# Patient Record
Sex: FEMALE | Race: WHITE | NOT HISPANIC OR LATINO | Employment: UNEMPLOYED | URBAN - METROPOLITAN AREA
[De-identification: names, ages, dates, MRNs, and addresses within clinical notes are randomized per-mention and may not be internally consistent; named-entity substitution may affect disease eponyms.]

---

## 2018-04-19 ENCOUNTER — TELEPHONE (OUTPATIENT)
Dept: SCHEDULING | Facility: CLINIC | Age: 48
End: 2018-04-19

## 2018-04-19 RX ORDER — HYDROCHLOROTHIAZIDE 25 MG/1
25 TABLET ORAL DAILY
Qty: 90 TABLET | Refills: 0 | Status: SHIPPED | OUTPATIENT
Start: 2018-04-19 | End: 2018-08-08 | Stop reason: SDUPTHER

## 2018-06-20 RX ORDER — METOPROLOL TARTRATE 25 MG/1
1 TABLET, FILM COATED ORAL 2 TIMES DAILY PRN
COMMUNITY
Start: 2017-06-14 | End: 2023-09-06 | Stop reason: SDUPTHER

## 2018-06-27 ENCOUNTER — TELEPHONE (OUTPATIENT)
Dept: SCHEDULING | Facility: CLINIC | Age: 48
End: 2018-06-27

## 2018-06-27 NOTE — TELEPHONE ENCOUNTER
Pt called to cx appt today 6/27 and would like to reschedule as soon as possible.  Pt states location does not matter.  Please contact on mobile to reschedule    ty

## 2018-07-11 ENCOUNTER — OFFICE VISIT (OUTPATIENT)
Dept: CARDIOLOGY | Facility: CLINIC | Age: 48
End: 2018-07-11
Payer: COMMERCIAL

## 2018-07-11 VITALS
DIASTOLIC BLOOD PRESSURE: 86 MMHG | HEART RATE: 93 BPM | BODY MASS INDEX: 34.55 KG/M2 | WEIGHT: 202.38 LBS | HEIGHT: 64 IN | OXYGEN SATURATION: 98 % | SYSTOLIC BLOOD PRESSURE: 116 MMHG

## 2018-07-11 DIAGNOSIS — I45.6 WOLFF-PARKINSON-WHITE (WPW) SYNDROME: ICD-10-CM

## 2018-07-11 DIAGNOSIS — R60.9 EDEMA, UNSPECIFIED TYPE: ICD-10-CM

## 2018-07-11 DIAGNOSIS — I10 ESSENTIAL HYPERTENSION: ICD-10-CM

## 2018-07-11 DIAGNOSIS — I47.10 PAROXYSMAL SUPRAVENTRICULAR TACHYCARDIA (CMS/HCC): Primary | ICD-10-CM

## 2018-07-11 DIAGNOSIS — R42 DIZZINESS: ICD-10-CM

## 2018-07-11 PROCEDURE — 99214 OFFICE O/P EST MOD 30 MIN: CPT | Performed by: INTERNAL MEDICINE

## 2018-07-11 PROCEDURE — 93000 ELECTROCARDIOGRAM COMPLETE: CPT | Performed by: INTERNAL MEDICINE

## 2018-07-12 NOTE — PROGRESS NOTES
Larissa Galdamez MD, [facility]  Electrophysiology  Additional office in [facility]  [phone]  [phone]  [facility].org/[facility]  The Heart [address]  [address] [facility]  [place] [address]  Patient MRN: [id number]  Date: 2017  Description: Prog Notes ([facility]) Cardiology EP  Category: Progress Notes ([facility])  Provider ID: [id number]  Practice ID: [id number]  Marcus ID: [id number]  2018  RE: GREG VIRGEN  : 1970  I had the pleasure of seeing Greg Virgen in the office today for follow up.  I last saw her for follow-up in 2017 for a history of supraventricular tachycardia status post catheter mediated ablation procedure and hypertension. She was accompanied by her mother and her young daughter.     Today the patient reports that in the past year her blood pressure has remained stable on low-dose diuretic therapy with hydrochlorothiazide.  She also was having problems in the past with edema which has also resolved.  She has not felt any episodes of sustained arrhythmias.  She only has occasional palpitations.  She has not taken the metoprolol tartrate which was prescribed to be taken as needed since she has not had any significant palpitations.  She has been losing weight on a weight watchers diet.  She states that her weight went even higher after I saw her one year ago and on the weight watchers diet she has lost at least 27 pounds.  She also reports that her and the personal level her the  stress in her personal life and her marriage have significantly improved and she is doing much better from the emotional standpoint.  She does the has not had any new medical problems diagnosed and no new medications.  However she does describes intermittent episodes of dizziness with an occasions may be frequent and worse with the hot weather.  She sometimes has to sit down since she is concerned  "that she could pass out but she has not had any true episodes of near syncope or syncope.  She recalls that she has had a history of vasovagal syncope when she was pregnant and the episodes feel the same way.  She has been taking the diuretic hydrochlorothiazide daily for blood pressure and lower extremity edema.  No associated palpitations with the episodes of dizziness.  No chest pain, PND orthopnea or pedal edema.      PROBLEM LIST:  1. Hypertension.  2. Supraventricular tachycardia status post ablation of a left-sided atrioventricular accessory  pathway in 1996.  3. Catheter ablation in 2009 for inducible atrioventricular node reentrant tachycardia.  4. Obesity.  5. Anxiety and depression secondary to significant stress in her personal life.  6.  Vasovagal symptoms during pregnancy.      CURRENT MEDICATIONS:    Current Outpatient Prescriptions:   •  hydrochlorothiazide (HYDRODIURIL) 25 mg tablet, Take 1 tablet (25 mg total) by mouth daily., Disp: 90 tablet, Rfl: 0  •  metoprolol tartrate (LOPRESSOR) 25 mg tablet, Take 1 tablet by mouth 2 (two) times a day as needed (palpitations or high blood pressue).  , Disp: , Rfl:      ALLERGIES: Sulfa.    SOCIAL HISTORY: She is  and lives with her  and younger daughter.  No history of cigarette smoking.  She drinks alcohol on social occasions.  Her mother who accompany her in the office today was diagnosed with cancer last this past year but is doing very well in remission.    REVIEW OF SYSTEMS: Review of systems as above in the History of Present Illness.    Vitals:    07/11/18 1238   BP: 116/86   BP Location: Right upper arm   Patient Position: Sitting   Pulse: 93   SpO2: 98%   Weight: 91.8 kg (202 lb 6 oz)   Height: 1.626 m (5' 4\")     PHYSICAL EXAMINATION: Healthy-appearing young woman in no acute distress.  Neck revealed no jugular venous distention.  No carotid bruits.  Lungs were clear to  auscultation. No rales or wheezes. Cardiovascular exam revealed " normal S1, S2, regular rhythm  with no murmurs, gallops or rubs. Abdomen was soft and nontender.  Obese.  No masses. Extremities  revealed no edema. Alert and oriented x 3, no focal deficits to gross neurologic exam.  Skin: No lesions.  Psychiatric normal mood and affect.    DATA REVIEW: I personally reviewed the 12-lead EKG today, which reveals sinus rhythm,  normal EKG.    Laboratory data from June 21, 2017 which included a CBC, comprehensive metabolic panel, magnesium level and TSH level were normal except for very mild elevation of liver function tests.    IMPRESSION/RECOMMENDATIONS:  1. Hypertension - Her blood pressure is well controlled on a daily dose of hydrochlorothiazide side.  However I told her to decrease her dose to every other day, particularly in the summer months, since her symptoms of intermittent dizziness may be secondary to low blood pressure and or dehydration in addition to vasovagal symptoms.    2.  Dizziness: As noted above I advised her to decrease her dose of hydrochlorothiazide to every other day and she may even take it take it only as needed's's to avoid dehydration particularly during the summer months and low blood pressure.  I will order a repeat comprehensive metabolic panel, magnesium level, TSH level and CBC for further evaluation to make sure that her electrolytes are within normal limits on the higher diuretic therapy and that she does not have anemia.      3. History of supraventricular tachycardia status post catheter-mediated ablation of AV  accessory pathway in 1996 and AV node reentrant tachycardia in 2009 - She has not had  recurrence of the arrhythmias.    4. History of anxiety and depression secondary to significant stress in her personal life -she is feeling much better.  There is no indication for treatment with antianxiety medication.    5.  Edema: She complain of  upper and lower extremity edema the time of the last office visit 1 year ago.  The symptoms have  resolved with mild general mild diuretic therapy.  I had asked her to obtain an echocardiogram last year but she had to cancel lead since on the day that she was schedule her daughter was sick.  I will reorder an echocardiogram to evaluate her left ventricular systolic function.      Larissa Elias M.D., [facility]

## 2018-07-16 ENCOUNTER — TELEPHONE (OUTPATIENT)
Dept: CARDIOLOGY | Facility: CLINIC | Age: 48
End: 2018-07-16

## 2018-07-16 NOTE — TELEPHONE ENCOUNTER
----- Message from Larissa Galdamez MD sent at 7/11/2018  9:08 PM EDT -----  Regarding: Echocardiogram  Please call the patient and let her know that I forgot to discussed with her obtaining an echocardiogram.  I ordered one last year but she had to cancel it when her daughter was sick.  I do not believe that she had the echocardiogram performed.  I order another one in addition to the blood tests.  She can have the echocardiogram at the [facility] or [facility].  It is not urgent but she should have it done in the next few months.  Thanks

## 2018-08-08 RX ORDER — HYDROCHLOROTHIAZIDE 25 MG/1
TABLET ORAL
Qty: 90 TABLET | Refills: 1 | Status: SHIPPED | OUTPATIENT
Start: 2018-08-08 | End: 2019-06-03 | Stop reason: SDUPTHER

## 2018-09-13 ENCOUNTER — TRANSCRIBE ORDERS (OUTPATIENT)
Dept: SCHEDULING | Age: 48
End: 2018-09-13

## 2018-09-13 DIAGNOSIS — R92.8 OTHER ABNORMAL AND INCONCLUSIVE FINDINGS ON DIAGNOSTIC IMAGING OF BREAST: Primary | ICD-10-CM

## 2018-09-13 DIAGNOSIS — N63.0 MASS OF BREAST: ICD-10-CM

## 2018-09-19 ENCOUNTER — HOSPITAL ENCOUNTER (OUTPATIENT)
Dept: RADIOLOGY | Age: 48
Discharge: HOME | End: 2018-09-19
Attending: OBSTETRICS & GYNECOLOGY
Payer: COMMERCIAL

## 2018-09-19 ENCOUNTER — HOSPITAL ENCOUNTER (OUTPATIENT)
Dept: RADIOLOGY | Age: 48
Discharge: HOME | End: 2018-09-19
Attending: RADIOLOGY
Payer: COMMERCIAL

## 2018-09-19 DIAGNOSIS — R92.8 OTHER ABNORMAL AND INCONCLUSIVE FINDINGS ON DIAGNOSTIC IMAGING OF BREAST: ICD-10-CM

## 2018-09-19 DIAGNOSIS — N63.0 MASS OF BREAST: ICD-10-CM

## 2018-09-19 DIAGNOSIS — R92.8 ABNORMAL MAMMOGRAM: ICD-10-CM

## 2018-09-19 PROCEDURE — 76642 ULTRASOUND BREAST LIMITED: CPT | Mod: 50

## 2018-09-19 PROCEDURE — 77066 DX MAMMO INCL CAD BI: CPT

## 2019-06-03 RX ORDER — HYDROCHLOROTHIAZIDE 25 MG/1
TABLET ORAL
Qty: 90 TABLET | Refills: 1 | Status: SHIPPED | OUTPATIENT
Start: 2019-06-03 | End: 2020-01-14 | Stop reason: SDUPTHER

## 2019-06-03 NOTE — TELEPHONE ENCOUNTER
90 Day supply       Medicine Refill Request    Last Office Visit: Visit date not found  Next Office Visit: Visit date not found        Current Outpatient Prescriptions:   •  hydrochlorothiazide (HYDRODIURIL) 25 mg tablet, Take 1 tablet by mouth every other day., Disp: 90 tablet, Rfl: 1  •  metoprolol tartrate (LOPRESSOR) 25 mg tablet, Take 1 tablet by mouth 2 (two) times a day as needed (palpitations or high blood pressue).  , Disp: , Rfl:       BP Readings from Last 3 Encounters:   07/11/18 116/86       Recent Lab results:  No results found for: CHOL, No results found for: HDL, No results found for: LDLCALC, No results found for: TRIG     Lab Results   Component Value Date    GLUCOSE 100 (H) 06/29/2015   , No results found for: HGBA1C      Lab Results   Component Value Date    CREATININE 0.89 06/29/2015       Lab Results   Component Value Date    TSH 2.84 06/29/2015

## 2019-06-28 ENCOUNTER — TRANSCRIBE ORDERS (OUTPATIENT)
Dept: SCHEDULING | Age: 49
End: 2019-06-28

## 2019-06-28 DIAGNOSIS — R92.8 OTHER ABNORMAL AND INCONCLUSIVE FINDINGS ON DIAGNOSTIC IMAGING OF BREAST: Primary | ICD-10-CM

## 2019-07-02 ENCOUNTER — TELEPHONE (OUTPATIENT)
Dept: SCHEDULING | Facility: CLINIC | Age: 49
End: 2019-07-02

## 2019-07-09 ENCOUNTER — HOSPITAL ENCOUNTER (OUTPATIENT)
Dept: RADIOLOGY | Facility: HOSPITAL | Age: 49
Discharge: HOME | End: 2019-07-09
Attending: OBSTETRICS & GYNECOLOGY
Payer: COMMERCIAL

## 2019-07-09 DIAGNOSIS — R92.8 OTHER ABNORMAL AND INCONCLUSIVE FINDINGS ON DIAGNOSTIC IMAGING OF BREAST: ICD-10-CM

## 2019-07-09 PROCEDURE — 76642 ULTRASOUND BREAST LIMITED: CPT | Mod: LT

## 2019-07-26 ENCOUNTER — TRANSCRIBE ORDERS (OUTPATIENT)
Dept: SCHEDULING | Age: 49
End: 2019-07-26

## 2019-07-26 DIAGNOSIS — R94.5 ABNORMAL RESULTS OF LIVER FUNCTION STUDIES: ICD-10-CM

## 2019-07-26 DIAGNOSIS — K76.0 FATTY (CHANGE OF) LIVER, NOT ELSEWHERE CLASSIFIED: ICD-10-CM

## 2019-07-26 DIAGNOSIS — R10.11 RIGHT UPPER QUADRANT PAIN: Primary | ICD-10-CM

## 2019-08-07 NOTE — TELEPHONE ENCOUNTER
Pt called needs to r/s appt that was for today with Dr Galdamez, Pt requesting any appt between 08/19-08/26 b/c she starts a new job. No avail appts. Pt can be reached at [phone]

## 2019-08-19 ENCOUNTER — HOSPITAL ENCOUNTER (OUTPATIENT)
Dept: RADIOLOGY | Facility: HOSPITAL | Age: 49
Discharge: HOME | End: 2019-08-19
Attending: INTERNAL MEDICINE
Payer: COMMERCIAL

## 2019-08-19 DIAGNOSIS — R94.5 ABNORMAL RESULTS OF LIVER FUNCTION STUDIES: ICD-10-CM

## 2019-08-19 DIAGNOSIS — K76.0 FATTY (CHANGE OF) LIVER, NOT ELSEWHERE CLASSIFIED: ICD-10-CM

## 2019-08-19 DIAGNOSIS — R10.11 RIGHT UPPER QUADRANT PAIN: ICD-10-CM

## 2019-08-19 PROCEDURE — 76705 ECHO EXAM OF ABDOMEN: CPT

## 2020-01-14 RX ORDER — HYDROCHLOROTHIAZIDE 25 MG/1
TABLET ORAL
Qty: 30 TABLET | Refills: 0 | Status: SHIPPED | OUTPATIENT
Start: 2020-01-14 | End: 2020-03-05 | Stop reason: SDUPTHER

## 2020-01-14 NOTE — TELEPHONE ENCOUNTER
DR YANG pt requesting HCTZ refill    Last OV was 7/18/2018 and no appts scheduled    Not escribed    Please advise

## 2020-03-05 RX ORDER — HYDROCHLOROTHIAZIDE 25 MG/1
TABLET ORAL
Qty: 40 TABLET | Refills: 0 | Status: SHIPPED | OUTPATIENT
Start: 2020-03-05 | End: 2020-03-09 | Stop reason: SDUPTHER

## 2020-03-05 NOTE — TELEPHONE ENCOUNTER
Medicine Refill Request    Last Office Visit: Visit date not found  Next Office Visit: Visit date not found    Pt requesting Med. Refill for Hydrochlorothiazide please.     Current Outpatient Medications:   •  hydrochlorothiazide (HYDRODIURIL) 25 mg tablet, Take 1 tablet by oral route every other day., Disp: 30 tablet, Rfl: 0  •  metoprolol tartrate (LOPRESSOR) 25 mg tablet, Take 1 tablet by mouth 2 (two) times a day as needed (palpitations or high blood pressue).  , Disp: , Rfl:       BP Readings from Last 3 Encounters:   07/11/18 116/86       Recent Lab results:  No results found for: CHOL, No results found for: HDL, No results found for: LDLCALC, No results found for: TRIG     Lab Results   Component Value Date    GLUCOSE 100 (H) 06/29/2015   , No results found for: HGBA1C      Lab Results   Component Value Date    CREATININE 0.89 06/29/2015       Lab Results   Component Value Date    TSH 2.84 06/29/2015

## 2020-03-05 NOTE — TELEPHONE ENCOUNTER
Short supply til after next OV no refills escribed to [facility]

## 2020-03-09 ENCOUNTER — TELEPHONE (OUTPATIENT)
Dept: SCHEDULING | Facility: CLINIC | Age: 50
End: 2020-03-09

## 2020-03-09 RX ORDER — HYDROCHLOROTHIAZIDE 25 MG/1
TABLET ORAL
Qty: 45 TABLET | Refills: 0 | Status: SHIPPED | OUTPATIENT
Start: 2020-03-09 | End: 2020-05-18 | Stop reason: SDUPTHER

## 2020-03-09 NOTE — TELEPHONE ENCOUNTER
Pt calling in to report that rx did not go thru to [facility]    Rx re-escribed at this time    Pt has f/u appt 4/8/20

## 2020-04-08 ENCOUNTER — TELEMEDICINE (OUTPATIENT)
Dept: CARDIOLOGY | Facility: CLINIC | Age: 50
End: 2020-04-08
Payer: COMMERCIAL

## 2020-04-08 ENCOUNTER — TELEPHONE (OUTPATIENT)
Dept: CARDIOLOGY | Facility: CLINIC | Age: 50
End: 2020-04-08

## 2020-04-08 DIAGNOSIS — I05.9 MITRAL VALVE DISORDER: ICD-10-CM

## 2020-04-08 DIAGNOSIS — I45.6 WOLFF-PARKINSON-WHITE (WPW) SYNDROME: ICD-10-CM

## 2020-04-08 DIAGNOSIS — I47.10 PAROXYSMAL SUPRAVENTRICULAR TACHYCARDIA (CMS/HCC): Primary | ICD-10-CM

## 2020-04-08 DIAGNOSIS — E78.5 DYSLIPIDEMIA: ICD-10-CM

## 2020-04-08 DIAGNOSIS — I10 ESSENTIAL HYPERTENSION: ICD-10-CM

## 2020-04-08 PROCEDURE — 99213 OFFICE O/P EST LOW 20 MIN: CPT | Mod: 95 | Performed by: INTERNAL MEDICINE

## 2020-04-08 NOTE — ASSESSMENT & PLAN NOTE
Her records show a history of possible mitral valve disorder in the past.  She will obtain an echocardiogram.  I order an echocardiogram 2018 but the patient did not have the test performed.  She agrees to have the echocardiogram performed in the next several months.

## 2020-04-08 NOTE — PROGRESS NOTES
Electrophysiology Telemedicine  Progress Note       Reason for visit: Supraventricular tachycardia, palpitations   You and I are about to have a telemedicine check-in or visit. This is allowed because you are already my patient, and you have agreed it. This telemedicine visit will be billed to your health insurance or you, if you are self-insured. You understand you will be responsible for any copayments or coinsurances that apply to your telemedicine visit. Before starting our telemedicine visit, I am required to get your consent for this virtual check-in or visit by telemedicine. Do you consent?  YES    4/8/2020    HPI   Taryn Virgen is a 49 y.o. 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 She also has a history of  Hypertension and occasional palpitations.    She reports today that since last seen in the office in July 2018 she has only had occasional palpitations.  She has a prescription for short acting Metroprolol tartrate to take as needed.  No sustained arrhythmias.  No hospitalizations.  No new medical problems.  She believes that her blood pressure has been well controlled.  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  She is now staying at home with her  and daughter due to the COVID-19 pandemic but is planning to return to work once her school opens again.  She is remaining active mostly at home doing housework and some playing outside with her daughter.  She has not been going for walks regularly.  No palpitations, dizziness, near syncope or syncope.  No chest pain. No dyspnea on exertion, PND, orthopnea or pedal edema.  She has not had the blood tests in a year and she did not scheduled the echocardiogram that I recommended in 2018.   She also reports that her daughter may have  problems with arrhythmias also since she her daughter had an episode of very fast heart rhythm which stopped when she asked her to do vagal maneuvers.  I advised her to contact [facility]  to make an appointment with a pediatric cardiac electrophysiologist.  She also reports today that her father was recently diagnosed with atrial fibrillation.  PROBLEM LIST:  1. Hypertension.  2. Supraventricular tachycardia status post ablation of a left-sided atrioventricular accessory  pathway in 1996.  3. Catheter ablation in 2009 for inducible atrioventricular node reentrant tachycardia.  4. Obesity.  5. Anxiety and depression secondary to significant stress in her personal life.  6.  Vasovagal symptoms during pregnancy      Patient Active Problem List   Diagnosis   • Mitral valve disorder   • Paroxysmal supraventricular tachycardia (CMS/HCC)   • Edema   • Dizziness   • Gloria-Parkinson-White (WPW) syndrome   • Essential hypertension        Past Medical History:   Diagnosis Date   • Arrhythmia    • Depression        Past Surgical History:   Procedure Laterality Date   • BREAST BIOPSY     • REDUCTION MAMMAPLASTY  2007   • VAGINAL HYSTERECTOMY  04/2016       Social History     Tobacco Use   • Smoking status: Never Smoker   • Smokeless tobacco: Never Used   Substance Use Topics   • Alcohol use: Yes     Comment: socially   • Drug use: Not on file        Family History  Family History   Problem Relation Age of Onset   • Hypertension Biological Mother    • Hypertension Biological Father    • No Known Problems Biological Sister    • No Known Problems Biological Brother    • Heart failure Paternal Grandmother    • Stroke Paternal Grandfather        Current Outpatient Medications   Medication Sig Dispense Refill   • hydrochlorothiazide (HYDRODIURIL) 25 mg tablet Take 1 tablet by oral route every other day. 45 tablet 0   • metoprolol tartrate (LOPRESSOR) 25 mg tablet Take 1 tablet by mouth 2 (two) times a day as needed (palpitations or high  blood pressue).         No current facility-administered medications for this visit.         Allergies   Allergen Reactions   • Sulfa (Sulfonamide Antibiotics)    • Sulfacetamide Sodium-Sulfur      NA   • Sulfamethoxazole-Trimethoprim      Other reaction(s): hives       ROS  As per the HPI, the patient denies chest pain, shortness of breath, lightheaded/dizziness, or syncope.  No orthopnea, PND, or lower extremity edema.  Comprehensive 10 point review of systems is otherwise negative.         Lab Results   Component Value Date    WBC 8.4 06/29/2015    HGB 14.3 06/29/2015     06/29/2015    ALT 56 (H) 06/29/2015    AST 30 06/29/2015     06/29/2015    K 4.2 06/29/2015    CREATININE 0.89 06/29/2015    TSH 2.84 06/29/2015             Assessment and Plan:    Paroxysmal supraventricular tachycardia (CMS/HCC)  She has a history of supraventricular tachycardia secondary to AV accessory mediated pathway, WPW, status post successful catheter ablation in the past and history of AV nannette reentry tachycardia with successful catheter ablation procedure.  She only has occasional symptoms of palpitations but no recurrence of sustained arrhythmia.  She has a prescription for Metroprolol tartrate to take as needed.    Essential hypertension  She reports that her blood pressure remains controlled on low-dose diuretic therapy.  She has not had blood tests in more than a year.  I will order routine blood test and magnesium level.    Mitral valve disorder  Her records show a history of possible mitral valve disorder in the past.  She will obtain an echocardiogram.  I order an echocardiogram 2018 but the patient did not have the test performed.  She agrees to have the echocardiogram performed in the next several months.        20  minutes of medical discussion took place.    Thank you for allowing me to participate in the care of your patient.  Please do not hesitate to contact me if you have any questions regarding my  recommendations and management.    Sincerely;    Larissa Elias MD [facility]    This document was generated utilizing voice recognition technology. A reasonable attempt at proofreading has been made to minimize errors but please excuse any typographical errors which may be present. Please call with any questions.

## 2020-04-08 NOTE — ASSESSMENT & PLAN NOTE
She has a history of supraventricular tachycardia secondary to AV accessory mediated pathway, WPW, status post successful catheter ablation in the past and history of AV nannette reentry tachycardia with successful catheter ablation procedure.  She only has occasional symptoms of palpitations but no recurrence of sustained arrhythmia.  She has a prescription for Metroprolol tartrate to take as needed.

## 2020-04-08 NOTE — ASSESSMENT & PLAN NOTE
She reports that her blood pressure remains controlled on low-dose diuretic therapy.  She has not had blood tests in more than a year.  I will order routine blood test and magnesium level.

## 2020-05-13 ENCOUNTER — TRANSCRIBE ORDERS (OUTPATIENT)
Dept: SCHEDULING | Age: 50
End: 2020-05-13

## 2020-05-13 DIAGNOSIS — R92.8 OTHER ABNORMAL AND INCONCLUSIVE FINDINGS ON DIAGNOSTIC IMAGING OF BREAST: Primary | ICD-10-CM

## 2020-07-01 ENCOUNTER — HOSPITAL ENCOUNTER (OUTPATIENT)
Dept: RADIOLOGY | Age: 50
Discharge: HOME | End: 2020-07-01
Attending: OBSTETRICS & GYNECOLOGY
Payer: COMMERCIAL

## 2020-07-01 ENCOUNTER — HOSPITAL ENCOUNTER (OUTPATIENT)
Dept: RADIOLOGY | Age: 50
Discharge: HOME | End: 2020-07-01
Attending: RADIOLOGY
Payer: COMMERCIAL

## 2020-07-01 DIAGNOSIS — R92.8 OTHER ABNORMAL AND INCONCLUSIVE FINDINGS ON DIAGNOSTIC IMAGING OF BREAST: ICD-10-CM

## 2020-07-01 DIAGNOSIS — R92.8 ABNORMAL MAMMOGRAM: ICD-10-CM

## 2020-07-01 PROCEDURE — 76642 ULTRASOUND BREAST LIMITED: CPT | Mod: LT

## 2020-07-01 PROCEDURE — 77066 DX MAMMO INCL CAD BI: CPT

## 2020-10-06 ENCOUNTER — TELEPHONE (OUTPATIENT)
Dept: SCHEDULING | Facility: CLINIC | Age: 50
End: 2020-10-06

## 2020-10-06 NOTE — TELEPHONE ENCOUNTER
Pt returned call to Pre-reg for appt.   Pt forgot she had an appt and wanted to Cancel it for tomorrow 10/7/2020 with Dr. Galdamez @ [facility].   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     Pt is re-scheduled for Wed. Nov. 25 th @ [facility] [place].     Pt can be reached @ [phone].     TY

## 2020-11-23 NOTE — TELEPHONE ENCOUNTER
Taryn called to request a telemed appt, she is taking care of her mother who has some new concerning kidney problems. She can be reached @ [phone]

## 2020-11-23 NOTE — TELEPHONE ENCOUNTER
Link: Yes, ok to do Telemedicine appointment.  I can do Telemedicine Wednesday or tomorrow after 2:00PM Writer reviewed below with Dr. Jones. VORB Dr. Puga; have pt wear a 2 wk event monitor to assess rates and rhythm prior to f/u appt. Will route to .

## 2020-11-25 ENCOUNTER — TELEMEDICINE (OUTPATIENT)
Dept: CARDIOLOGY | Facility: CLINIC | Age: 50
End: 2020-11-25
Payer: COMMERCIAL

## 2020-11-25 DIAGNOSIS — I05.9 MITRAL VALVE DISORDER: ICD-10-CM

## 2020-11-25 DIAGNOSIS — I45.6 WOLFF-PARKINSON-WHITE (WPW) SYNDROME: ICD-10-CM

## 2020-11-25 DIAGNOSIS — I47.10 PAROXYSMAL SUPRAVENTRICULAR TACHYCARDIA (CMS/HCC): Primary | ICD-10-CM

## 2020-11-25 DIAGNOSIS — I10 ESSENTIAL HYPERTENSION: ICD-10-CM

## 2020-11-25 PROBLEM — K76.0 FATTY LIVER DISEASE, NONALCOHOLIC: Status: ACTIVE | Noted: 2020-11-25

## 2020-11-25 PROCEDURE — 99213 OFFICE O/P EST LOW 20 MIN: CPT | Mod: 95 | Performed by: INTERNAL MEDICINE

## 2020-11-25 RX ORDER — HYDROCHLOROTHIAZIDE 25 MG/1
TABLET ORAL
Qty: 90 TABLET | Refills: 3 | Status: SHIPPED | OUTPATIENT
Start: 2020-11-25 | End: 2020-12-02 | Stop reason: SDUPTHER

## 2020-11-25 NOTE — ASSESSMENT & PLAN NOTE
The patient has past history of possible mitral valve disease.  She is asymptomatic.  She has not had an echocardiogram in many years.  She did not have the echocardiogram which I ordered in 2018.  After the Covid -19 pandemic I will order echocardiogram for further evaluation.

## 2020-11-25 NOTE — PROGRESS NOTES
Electrophysiology Telemedicine  Progress Note       Reason for visit: Supraventricular tachycardia, palpitations   You and I are about to have a telemedicine check-in or visit. This is allowed because you are already my patient, and you have agreed it. This telemedicine visit will be billed to your health insurance or you, if you are self-insured. You understand you will be responsible for any copayments or coinsurances that apply to your telemedicine visit. Before starting our telemedicine visit, I am required to get your consent for this virtual check-in or visit by telemedicine. Do you consent?  YES    11/25/2020    HPI   Taryn Virgen is a 49 y.o. 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 She also has a history of  hypertension and occasional palpitations.  I did a Telemedicine appointment in April 2020 due to COVID-19 pandemic.  At that time she reported feeling only occasional palpitations.    Today she reports that she rarely feels palpitations.  She has a prescription for short acting Metroprolol tartrate to take as needed.  No sustained arrhythmias.  No hospitalizations.  No new medical problems.  She believes that her blood pressure has been well controlled.    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  She is remaining active mostly at home doing housework and some playing outside with her daughter.  She has not been going for walks regularly.  No palpitations, dizziness, near syncope or syncope.  No chest pain. No dyspnea on exertion, PND, orthopnea.  She intermittently gets edema at there and she takes an extra hydrochlorothiazide.    She also reports that her daughter continues have problems with arrhythmias which terminate with vagal maneuvers.  She saw pediatric  electrophysiologist at [facility] but she is frustrated since they are not able to diagnose her arrhythmia and they do not believe her when she states that her symptoms are similar to the ones that she had when she was young before the ablation procedure.  She is planning to get a second opinion at [facility].  She has not had blood tests in more than a year therefore I will order routine blood tests.  She had been diagnosed in the past with fatty liver and she is was to make sure that the liver enzymes are included in her blood tests.    PROBLEM LIST:  1. Hypertension.  2. Supraventricular tachycardia status post ablation of a left-sided atrioventricular accessory  pathway in 1996.  3. Catheter ablation in 2009 for inducible atrioventricular node reentrant tachycardia.  4. Obesity.  5. Anxiety and depression secondary to significant stress in her personal life.  6.  Vasovagal symptoms during pregnancy      Patient Active Problem List   Diagnosis   • Mitral valve disorder   • Paroxysmal supraventricular tachycardia (CMS/HCC)   • Edema   • Dizziness   • Gloria-Parkinson-White (WPW) syndrome   • Essential hypertension   • Fatty liver disease, nonalcoholic        Past Medical History:   Diagnosis Date   • Arrhythmia    • Depression        Past Surgical History:   Procedure Laterality Date   • BREAST BIOPSY     • REDUCTION MAMMAPLASTY  2007   • VAGINAL HYSTERECTOMY  04/2016       Social History     Tobacco Use   • Smoking status: Never Smoker   • Smokeless tobacco: Never Used   Substance Use Topics   • Alcohol use: Yes     Comment: socially   • Drug use: Not on file        Family History  Family History   Problem Relation Age of Onset   • Hypertension Biological Mother    • Hypertension Biological Father    • No Known Problems Biological Sister    • No Known Problems Biological Brother    • Heart failure Paternal Grandmother    • Stroke Paternal Grandfather        Current Outpatient Medications   Medication Sig  Dispense Refill   • hydrochlorothiazide (HYDRODIURIL) 25 mg tablet Take 1 tablet by oral route every other day and every day as needed for swelling. 90 tablet 3   • metoprolol tartrate (LOPRESSOR) 25 mg tablet Take 1 tablet by mouth 2 (two) times a day as needed (palpitations or high blood pressue).         No current facility-administered medications for this visit.         Allergies   Allergen Reactions   • Sulfa (Sulfonamide Antibiotics)    • Sulfacetamide Sodium-Sulfur      NA   • Sulfamethoxazole-Trimethoprim      Other reaction(s): hives       ROS  As per the HPI, the patient denies chest pain, shortness of breath, lightheaded/dizziness, or syncope.  No orthopnea, PND,  Comprehensive 10 point review of systems is otherwise negative.              Assessment and Plan:    Paroxysmal supraventricular tachycardia (CMS/HCC)  No recurrence of arrhythmia after undergoing catheter ablation procedure for an AV accessory mediated pathway and also catheter ablation procedure for atrioventricular node reentry tachycardia.  She will continue to take Metroprolol tartrate intermittently for brief symptoms of palpitations.    Essential hypertension  She reports that her blood pressure remains controlled on low-dose diuretic therapy.  She has not had blood tests in more than a year.  I will order routine blood test and magnesium level.    Mitral valve disorder  The patient has past history of possible mitral valve disease.  She is asymptomatic.  She has not had an echocardiogram in many years.  She did not have the echocardiogram which I ordered in 2018.  After the Covid -19 pandemic I will order echocardiogram for further evaluation.    Gloria-Parkinson-White (WPW) syndrome  No recurrence of tachycardia after undergoing catheter mediated ablation procedure in 1996.    I renewed her prescription for hydrochlorothiazide and I will asked the office staff to mail her the blood test orders.  See her for follow-up in the office in 6  months.    15  minutes of medical discussion took place.    Thank you for allowing me to participate in the care of your patient.  Please do not hesitate to contact me if you have any questions regarding my recommendations and management.    Sincerely;    Larissa Elias MD [facility]    This document was generated utilizing voice recognition technology. A reasonable attempt at proofreading has been made to minimize errors but please excuse any typographical errors which may be present. Please call with any questions.

## 2020-11-25 NOTE — ASSESSMENT & PLAN NOTE
No recurrence of arrhythmia after undergoing catheter ablation procedure for an AV accessory mediated pathway and also catheter ablation procedure for atrioventricular node reentry tachycardia.  She will continue to take Metroprolol tartrate intermittently for brief symptoms of palpitations.

## 2020-12-02 RX ORDER — HYDROCHLOROTHIAZIDE 25 MG/1
TABLET ORAL
Qty: 90 TABLET | Refills: 3 | Status: SHIPPED | OUTPATIENT
Start: 2020-12-02 | End: 2021-12-14 | Stop reason: SDUPTHER

## 2021-05-13 RX ORDER — HYDROCHLOROTHIAZIDE 25 MG/1
25 TABLET ORAL DAILY
COMMUNITY

## 2021-05-14 ENCOUNTER — OFFICE VISIT (OUTPATIENT)
Dept: GASTROENTEROLOGY | Facility: CLINIC | Age: 51
End: 2021-05-14
Payer: COMMERCIAL

## 2021-05-14 VITALS
HEIGHT: 63 IN | DIASTOLIC BLOOD PRESSURE: 96 MMHG | BODY MASS INDEX: 37.92 KG/M2 | WEIGHT: 214 LBS | SYSTOLIC BLOOD PRESSURE: 138 MMHG | HEART RATE: 90 BPM

## 2021-05-14 DIAGNOSIS — R10.11 RIGHT UPPER QUADRANT ABDOMINAL PAIN: Primary | ICD-10-CM

## 2021-05-14 PROCEDURE — 99244 OFF/OP CNSLTJ NEW/EST MOD 40: CPT | Performed by: INTERNAL MEDICINE

## 2021-05-14 NOTE — PROGRESS NOTES
[id number] [facility] - Outpatient Consultation  Steve Menjivar 52 y o  female MRN: [id number]  Encounter: [id number]    ASSESSMENT AND PLAN:      1  Right upper quadrant abdominal pain    44-year-old female with right upper quadrant pain after eating fatty foods  Ultrasound shows fatty liver but no gallstones  I explained Vince Fairly that about 20% of people with biliary colic will have no stones on the ultrasound those are usually visualized by CCK HIDA scan which I have ordered  If it is negative there is a small chance this could be a common bile duct stone and I would consider an MRCP but this is not   Very likely  - HCV FIBROSURE; Future  - NM hepatobiliary w rx; Future  - HCV FIBROSURE    2  Simple fatty liver by ultrasound liver enzymes were essentially normal   Previous testing for viral hepatitis and alternative causes of liver disease were negative I did view the results on her phone  We discussed fatty liver at length  I would order a fiber score to make sure she does not have scar tissue which would perhaps make steatohepatitis more likely something that would need to be followed more closely but if it shows no fibrosis I would not pursue this further  Followup Appointment:   Two months  ______________________________________________________________________    Chief Complaint   Patient presents with    RUQ abd pain     referred by Jeff Beckford    Fatty Liver       HPI:   Steve Menjivar is a  very pleasant 52y o  year old female who presents  With right upper quadrant discomfort in her abdomen after eating a variety of foods sometimes almost any foods for the last several years  She says fatty foods or fried foods are worse  Nothing is completely good  When she gets the discomfort occurs about an hour after eating and usually goes away over several hours  No belching no nausea no vomiting    No change in her bowel habits which are normal   Her weight has gone up and down she said she has recently lost about 20 lb  She was noted to have fatty liver  She is diagnosed with this in the past and evaluation was negative for alternative causes I did look at her blood work on lab core on her personal portal hepatitis studies ceruloplasmin alpha-1 antitrypsin were all negative as were viral hepatitis studies  Historical Information   Past Medical History:   Diagnosis Date    Supraventricular tachycardia, paroxysmal (HCC)     WPW (Linda-Parkinson-White syndrome)      Past Surgical History:   Procedure Laterality Date    CARDIAC ELECTROPHYSIOLOGY MAPPING AND ABLATION      HYSTERECTOMY      REDUCTION MAMMAPLASTY       Social History     Substance and Sexual Activity   Alcohol Use None     Social History     Substance and Sexual Activity   Drug Use Not on file     Social History     Tobacco Use   Smoking Status Former Smoker   Smokeless Tobacco Never Used     Family History   Problem Relation Age of Onset    Hypertension Mother    Homero Rodas Breast cancer Mother     Cancer Mother     Other Mother         cholangeal carcinoma    Hypertension Father        Meds/Allergies     Current Outpatient Medications:     hydrochlorothiazide (HYDRODIURIL) 25 mg tablet    metoprolol tartrate (LOPRESSOR) 25 mg tablet    Allergies   Allergen Reactions    Bactrim Ds [Sulfamethoxazole-Trimethoprim] Hives       PHYSICAL EXAM:    Blood pressure 138/96, pulse 90, height 5' 3" (1 6 m), weight 97 1 kg (214 lb)  Body mass index is 37 91 kg/m²  General Appearance: NAD, cooperative, alert  Eyes: Anicteric, PERRLA, EOMI  ENT:  Normocephalic, atraumatic, normal mucosa  Neck:  Supple, symmetrical, trachea midline,   Resp:  Clear to auscultation bilaterally; no rales, rhonchi or wheezing; respirations unlabored   CV:  S1 S2, Regular rate and rhythm; no murmur, rub, or gallop    GI:  Soft, non-tender, non-distended; normal bowel sounds; no masses, no organomegaly   Rectal: Deferred  Musculoskeletal: No cyanosis, clubbing or edema  Normal ROM  Skin:  No jaundice, rashes, or lesions   Heme/Lymph: No palpable cervical lymphadenopathy  Psych: Normal affect, good eye contact  Neuro: No gross deficits, AAOx3    Lab Results:   No results found for: WBC, HGB, HCT, MCV, PLT  No results found for: NA, K, CL, CO2, ANIONGAP, BUN, CREATININE, GLUCOSE, GLUF, CALCIUM, CORRECTEDCA, AST, ALT, ALKPHOS, PROT, BILITOT, EGFR  No results found for: IRON, TIBC, FERRITIN  No results found for: LIPASE    Radiology Results:   No results found  REVIEW OF SYSTEMS:    CONSTITUTIONAL: Denies any fever, chills, rigors, and weight loss  HEENT: No earache or tinnitus  Denies hearing loss or visual disturbances  CARDIOVASCULAR: No chest pain or palpitations  RESPIRATORY: Denies any cough, hemoptysis, shortness of breath or dyspnea on exertion  GASTROINTESTINAL: As noted in the History of Present Illness  GENITOURINARY: No problems with urination  Denies any hematuria or dysuria  NEUROLOGIC: No dizziness or vertigo, denies headaches  MUSCULOSKELETAL: Denies any muscle or joint pain  SKIN: Denies skin rashes or itching  ENDOCRINE: Denies excessive thirst  Denies intolerance to heat or cold  PSYCHOSOCIAL: Denies depression or anxiety  Denies any recent memory loss

## 2021-06-01 ENCOUNTER — TELEPHONE (OUTPATIENT)
Dept: SCHEDULING | Facility: CLINIC | Age: 51
End: 2021-06-01

## 2021-06-01 NOTE — TELEPHONE ENCOUNTER
Pt would like to know if she can have telemed apt for tomorrow instead of being in office. Pt has no sitter for her kid. Pt can be reach at [phone]

## 2021-06-01 NOTE — TELEPHONE ENCOUNTER
Since her last visit was Telemedicine, it is better that she reschedules when she can be seen in person to get EKG and examine her.

## 2021-06-16 ENCOUNTER — OFFICE VISIT (OUTPATIENT)
Dept: CARDIOLOGY | Facility: CLINIC | Age: 51
End: 2021-06-16
Payer: COMMERCIAL

## 2021-06-16 VITALS
HEIGHT: 64 IN | SYSTOLIC BLOOD PRESSURE: 106 MMHG | HEART RATE: 78 BPM | BODY MASS INDEX: 35.68 KG/M2 | DIASTOLIC BLOOD PRESSURE: 72 MMHG | WEIGHT: 209 LBS | OXYGEN SATURATION: 97 %

## 2021-06-16 DIAGNOSIS — I05.9 MITRAL VALVE DISORDER: ICD-10-CM

## 2021-06-16 DIAGNOSIS — I47.10 PAROXYSMAL SUPRAVENTRICULAR TACHYCARDIA (CMS/HCC): Primary | ICD-10-CM

## 2021-06-16 DIAGNOSIS — I10 ESSENTIAL HYPERTENSION: ICD-10-CM

## 2021-06-16 DIAGNOSIS — I45.6 WOLFF-PARKINSON-WHITE (WPW) SYNDROME: ICD-10-CM

## 2021-06-16 PROCEDURE — 3008F BODY MASS INDEX DOCD: CPT | Performed by: INTERNAL MEDICINE

## 2021-06-16 PROCEDURE — 93000 ELECTROCARDIOGRAM COMPLETE: CPT | Performed by: INTERNAL MEDICINE

## 2021-06-16 PROCEDURE — 3074F SYST BP LT 130 MM HG: CPT | Performed by: INTERNAL MEDICINE

## 2021-06-16 PROCEDURE — 3078F DIAST BP <80 MM HG: CPT | Performed by: INTERNAL MEDICINE

## 2021-06-16 PROCEDURE — 99214 OFFICE O/P EST MOD 30 MIN: CPT | Performed by: INTERNAL MEDICINE

## 2021-06-16 NOTE — PROGRESS NOTES
Larissa Galdamez MD, [facility]  Cardiac Electrophysiology    [facility]    [facility]  The [facility] [address]  [address] [place] [address]    TEL  [phone]  [facility]     e ID: [id number]                 Electrophysiology Progress Note                        June 16, 2021    Taryn Virgen is a 50 y.o. female who comes to the office today for a follow up for a history of supraventricular tachycardia status post catheter mediated ablation procedure for WPW and for AVNRT and a history of hypertension. She was accompanied by her mother and her young daughter.    At her last telemedicine visit on 11/25/2020 she reported that she was feeling well from a cardiovascular standpoint and rarely feels palpitations.     Today, she is feeling generally well. She reports that 2 weeks ago she had one isolated episode of palpitations occurring in the middle of the night, lasting about 20 minutes. Otherwise, she has been feeling well from a cardiovascular standpoint. She has been under a significant amount of stress in her personal life. She states that she has been working on losing weight and living a healthy lifestyle. Of note, she lost 24 lbs. She reports that she received both doses of the COVID-19 vaccine. No dizziness, near syncope or syncope. No chest pain. No dyspnea on exertion, PND, orthopnea or pedal edema. Sadly her mother was diagnosed recently with a GI cancer.    PROBLEM LIST:  1. Hypertension.  2. Supraventricular tachycardia status post ablation of a left-sided atrioventricular accessory  pathway in 1996.  3. Catheter ablation in 2009 for inducible atrioventricular node reentrant tachycardia.  4. Obesity.  5. Anxiety and depression secondary to significant stress in her personal life.  6.  Vasovagal symptoms during pregnancy.    Patient Active Problem List   Diagnosis   • Mitral valve disorder   • Paroxysmal supraventricular tachycardia  "(CMS/[facility])   • Edema   • Dizziness   • Gloria-Parkinson-White (WPW) syndrome   • Essential hypertension   • Fatty liver disease, nonalcoholic     Past Medical History:   Diagnosis Date   • Arrhythmia    • Depression       Past Surgical History:   Procedure Laterality Date   • BREAST BIOPSY     • REDUCTION MAMMAPLASTY  2007   • VAGINAL HYSTERECTOMY  04/2016      CURRENT MEDICATIONS:  Current Outpatient Medications   Medication Sig Dispense Refill   • hydrochlorothiazide (HYDRODIURIL) 25 mg tablet Take 1 tablet by oral route once daily as needed. 90 tablet 3   • metoprolol tartrate (LOPRESSOR) 25 mg tablet Take 1 tablet by mouth 2 (two) times a day as needed (palpitations or high blood pressue).         No current facility-administered medications for this visit.     ALLERGIES: Sulfa.    SOCIAL HISTORY: She is  and lives with her  and younger daughter. She has stepsons.  No history of cigarette smoking.  She drinks alcohol on social occasions.  Her mother who accompany her in the office today was diagnosed with cancer last this past year but is doing very well in remission.    REVIEW OF SYSTEMS:   As in HPI.  All other other systems were reviewed and are negative.    Constitution: Negative for fever and malaise/fatigue.       Vitals:    06/16/21 1422   BP: 106/72   BP Location: Right upper arm   Patient Position: Sitting   Pulse: 78   SpO2: 97%   Weight: 94.8 kg (209 lb)   Height: 1.626 m (5' 4\")      PHYSICAL EXAMINATION: Healthy-appearing young woman in no acute distress.  Neck revealed no jugular venous distention.  No carotid bruits.  Lungs were clear to  auscultation. No rales or wheezes. Cardiovascular exam revealed normal S1, S2, regular rhythm  with no murmurs, gallops or rubs. Abdomen was soft and nontender.  Obese.  No masses. Extremities  revealed no edema. Alert and oriented x 3, no focal deficits to gross neurologic exam.  Skin: No lesions.  Psychiatric normal mood and affect.    DATA " REVIEW:    ECG 6/16/2021: I personally reviewed her 12-lead EKG performed today which reveals   Sinus  Rhythm   Nonspecific T-abnormality.   No ventricular preexcitation.    Laboratory data from June 21, 2017 which included a CBC, comprehensive metabolic panel, magnesium level and TSH level were normal except for very mild elevation of liver function tests.    IMPRESSION/RECOMMENDATIONS:    Assessment/Plan     Gloria-Parkinson-White (WPW) syndrome  She had one episode of palpitations at time of stress.   No recurrence of tachycardia after undergoing catheter mediated ablation procedure in 1996.    Paroxysmal supraventricular tachycardia (CMS/HCC)  She hd one episode of palpitations but no recurrence of arrhythmia after undergoing catheter ablation procedure for an AV accessory mediated pathway and also catheter ablation procedure for atrioventricular node reentry tachycardia.    She will continue to take Metroprolol tartrate only as needed for brief symptoms of palpitations.    Essential hypertension  She reports that her blood pressure remains controlled on low-dose diuretic therapy and is low normal. If she continues to lose weight; she may not need antihypertensive medications.   She did not get the blood tests which I ordered in November 2020.  She has not had blood tests in at least 2 years.    She agrees to get  routine blood tests and magnesium level which I previously ordered.    Mitral valve disorder  She has not had an echcocardiogram years and she did not have the echocardiogram I previously ordered due to COVID-19 pandemic. She is asymptomatic.  At next office visit I will reorder an echcocardiogram.    Return in about 6 months (around 12/16/2021).    I will call the patient with the results of the blood work upon completion.     Larissa Elias M.D., [facility]    By signing my name below, I, Allyson Corbin, attest that this documentation has been prepared under the direction and in the presence of Larissa  MD Rudolph Electronically signed: Komal Chaves. 6/16/2021 2:22 PM     I, Larissa Galdamez MD, personally performed the services described in this documentation. All medical record entries made by the scribe were at my direction and in my presence. I have reviewed the chart and agree that the record reflects my personal performance and is accurate and complete. Larissa Galdamez MD. 6/16/2021. 2:22 PM.

## 2021-06-22 NOTE — ASSESSMENT & PLAN NOTE
She has not had an echcocardiogram years and she did not have the echocardiogram I previously ordered due to COVID-19 pandemic. She is asymptomatic.  At next office visit I will reorder an echcocardiogram.

## 2021-06-22 NOTE — ASSESSMENT & PLAN NOTE
She reports that her blood pressure remains controlled on low-dose diuretic therapy and is low normal. If she continues to lose weight; she may not need antihypertensive medications.   She did not get the blood tests which I ordered in November 2020.  She has not had blood tests in at least 2 years.    She agrees to get  routine blood tests and magnesium level which I previously ordered.

## 2021-06-22 NOTE — ASSESSMENT & PLAN NOTE
She hd one episode of palpitations but no recurrence of arrhythmia after undergoing catheter ablation procedure for an AV accessory mediated pathway and also catheter ablation procedure for atrioventricular node reentry tachycardia.    She will continue to take Metroprolol tartrate only as needed for brief symptoms of palpitations.

## 2021-06-22 NOTE — ASSESSMENT & PLAN NOTE
She had one episode of palpitations at time of stress.   No recurrence of tachycardia after undergoing catheter mediated ablation procedure in 1996.

## 2021-08-02 ENCOUNTER — TRANSCRIBE ORDERS (OUTPATIENT)
Dept: SCHEDULING | Age: 51
End: 2021-08-02

## 2021-08-02 DIAGNOSIS — Z80.3 FAMILY HISTORY OF MALIGNANT NEOPLASM OF BREAST: Primary | ICD-10-CM

## 2021-08-16 ENCOUNTER — HOSPITAL ENCOUNTER (OUTPATIENT)
Dept: RADIOLOGY | Age: 51
Discharge: HOME | End: 2021-08-16
Attending: OBSTETRICS & GYNECOLOGY
Payer: COMMERCIAL

## 2021-08-16 DIAGNOSIS — Z80.3 FAMILY HISTORY OF MALIGNANT NEOPLASM OF BREAST: ICD-10-CM

## 2021-08-16 PROCEDURE — G0279 TOMOSYNTHESIS, MAMMO: HCPCS

## 2021-11-22 ENCOUNTER — TELEPHONE (OUTPATIENT)
Dept: SCHEDULING | Facility: CLINIC | Age: 51
End: 2021-11-22

## 2021-11-22 NOTE — TELEPHONE ENCOUNTER
Patient is asking for a call from Mayra in [facility] - did not want to reschedule or discuss the appt changes she wished to make with me.    She can be reached at: [phone]

## 2021-12-14 RX ORDER — HYDROCHLOROTHIAZIDE 25 MG/1
TABLET ORAL
Qty: 30 TABLET | Refills: 0 | Status: SHIPPED | OUTPATIENT
Start: 2021-12-14 | End: 2022-01-27 | Stop reason: SDUPTHER

## 2022-01-27 RX ORDER — HYDROCHLOROTHIAZIDE 25 MG/1
TABLET ORAL
Qty: 30 TABLET | Refills: 6 | Status: SHIPPED | OUTPATIENT
Start: 2022-01-27 | End: 2022-09-14 | Stop reason: SDUPTHER

## 2022-01-27 NOTE — TELEPHONE ENCOUNTER
Medicine Refill Request    Last Office: Visit date not found   Last Consult Visit: Visit date not found  Last Telemedicine Visit: Visit date not found    Pt req refill on hydrochlorothiazide      Current Outpatient Medications:   •  hydrochlorothiazide 25 mg tablet, Take 1 tablet by oral route once daily as needed., Disp: 30 tablet, Rfl: 0  •  metoprolol tartrate (LOPRESSOR) 25 mg tablet, Take 1 tablet by mouth 2 (two) times a day as needed (palpitations or high blood pressue).  , Disp: , Rfl:       BP Readings from Last 3 Encounters:   06/16/21 106/72   07/11/18 116/86       Recent Lab results:  No results found for: CHOL, No results found for: HDL, No results found for: LDLCALC, No results found for: TRIG     Lab Results   Component Value Date    GLUCOSE 100 (H) 06/29/2015   , No results found for: HGBA1C      Lab Results   Component Value Date    CREATININE 0.89 06/29/2015       Lab Results   Component Value Date    TSH 2.84 06/29/2015

## 2022-04-12 ENCOUNTER — TELEPHONE (OUTPATIENT)
Dept: SCHEDULING | Facility: CLINIC | Age: 52
End: 2022-04-12
Payer: COMMERCIAL

## 2022-04-12 NOTE — TELEPHONE ENCOUNTER
Pt states her car is in the shop and she is unable to make her appt tomorrow.  Pt inquiring if it can be a telemed appt or if she needs to reschedule.   Pt states she is not having any issues.  Pt can be reached at [phone].

## 2022-04-13 NOTE — TELEPHONE ENCOUNTER
Spoke with Dr. Galdamez and she would like to see the patient in the office.     Called and spoke to the patient and r/s this appt.

## 2022-08-29 ENCOUNTER — TRANSCRIBE ORDERS (OUTPATIENT)
Dept: SCHEDULING | Age: 52
End: 2022-08-29

## 2022-08-29 DIAGNOSIS — Z87.898 PERSONAL HISTORY OF OTHER SPECIFIED CONDITIONS: Primary | ICD-10-CM

## 2022-09-14 NOTE — TELEPHONE ENCOUNTER
Medicine Refill Request    Last Office: Visit date not found   Last Consult Visit: Visit date not found  Last Telemedicine Visit: Visit date not found    Pt req refill on hydrochlorothiazide      Current Outpatient Medications:     hydrochlorothiazide 25 mg tablet, Take 1 tablet by oral route once daily as needed., Disp: 30 tablet, Rfl: 6    metoprolol tartrate (LOPRESSOR) 25 mg tablet, Take 1 tablet by mouth 2 (two) times a day as needed (palpitations or high blood pressue).  , Disp: , Rfl:       BP Readings from Last 3 Encounters:   06/16/21 106/72   07/11/18 116/86       Recent Lab results:  No results found for: CHOL, No results found for: HDL, No results found for: LDLCALC, No results found for: TRIG     Lab Results   Component Value Date    GLUCOSE 100 (H) 06/29/2015   , No results found for: HGBA1C      Lab Results   Component Value Date    CREATININE 0.89 06/29/2015       Lab Results   Component Value Date    TSH 2.84 06/29/2015

## 2022-09-15 RX ORDER — HYDROCHLOROTHIAZIDE 25 MG/1
TABLET ORAL
Qty: 30 TABLET | Refills: 6 | Status: SHIPPED | OUTPATIENT
Start: 2022-09-15 | End: 2023-05-01 | Stop reason: SDUPTHER

## 2022-09-26 ENCOUNTER — HOSPITAL ENCOUNTER (OUTPATIENT)
Dept: RADIOLOGY | Age: 52
Discharge: HOME | End: 2022-09-26
Attending: OBSTETRICS & GYNECOLOGY
Payer: COMMERCIAL

## 2022-09-26 DIAGNOSIS — Z87.898 PERSONAL HISTORY OF OTHER SPECIFIED CONDITIONS: ICD-10-CM

## 2022-09-26 PROCEDURE — G0279 TOMOSYNTHESIS, MAMMO: HCPCS

## 2023-04-29 ENCOUNTER — TELEPHONE (OUTPATIENT)
Dept: SCHEDULING | Facility: CLINIC | Age: 53
End: 2023-04-29
Payer: COMMERCIAL

## 2023-04-30 RX ORDER — HYDROCHLOROTHIAZIDE 25 MG/1
TABLET ORAL
Qty: 30 TABLET | Refills: 6 | OUTPATIENT
Start: 2023-04-30 | End: 2024-04-27

## 2023-05-01 RX ORDER — HYDROCHLOROTHIAZIDE 25 MG/1
TABLET ORAL
Qty: 30 TABLET | Refills: 1 | Status: SHIPPED | OUTPATIENT
Start: 2023-05-01 | End: 2023-06-21 | Stop reason: SDUPTHER

## 2023-05-01 NOTE — TELEPHONE ENCOUNTER
DR YANG pt--pharmacy requests refill HCTZ    Last OV was 6/16/2021   no appts scheduled--multiple cancellations    Notified Pharmacy to advise PT to call our office for an office visit for evaluation and any refills    Not escribed.    Please advise of any changes.    Thank you    PSR pool--please reach out to PT to make an appointment for evaluation and refills--Thank you

## 2023-06-12 ENCOUNTER — TRANSCRIBE ORDERS (OUTPATIENT)
Dept: SCHEDULING | Age: 53
End: 2023-06-12

## 2023-06-12 DIAGNOSIS — N63.20 UNSPECIFIED LUMP IN THE LEFT BREAST, UNSPECIFIED QUADRANT: Primary | ICD-10-CM

## 2023-06-21 RX ORDER — HYDROCHLOROTHIAZIDE 25 MG/1
TABLET ORAL
Qty: 30 TABLET | Refills: 1 | Status: SHIPPED | OUTPATIENT
Start: 2023-06-21 | End: 2024-09-11

## 2023-06-21 NOTE — TELEPHONE ENCOUNTER
Pt was suppose to have appt 6/21 provider left sick. Pt is r/s to 8/9 but will need medication Hydrochlorothiazide refilled.

## 2023-06-27 ENCOUNTER — HOSPITAL ENCOUNTER (OUTPATIENT)
Dept: RADIOLOGY | Facility: HOSPITAL | Age: 53
Discharge: HOME | End: 2023-06-27
Attending: OBSTETRICS & GYNECOLOGY
Payer: COMMERCIAL

## 2023-06-27 ENCOUNTER — TELEPHONE (OUTPATIENT)
Dept: SCHEDULING | Facility: CLINIC | Age: 53
End: 2023-06-27
Payer: COMMERCIAL

## 2023-06-27 DIAGNOSIS — N63.20 UNSPECIFIED LUMP IN THE LEFT BREAST, UNSPECIFIED QUADRANT: ICD-10-CM

## 2023-06-27 PROCEDURE — 77066 DX MAMMO INCL CAD BI: CPT

## 2023-06-27 PROCEDURE — 76642 ULTRASOUND BREAST LIMITED: CPT | Mod: LT

## 2023-06-27 NOTE — TELEPHONE ENCOUNTER
Pt calling regarding her Metoprolol 25 mg. States she ran out recently because she's been having some fluttering and was advised to take when the flutter returns.     Had an appt but was cancelled due to Dr Galdamez not feeling well. Wanted to know if a new script can be sent in for her.    Pt can be reached at [phone]

## 2023-06-27 NOTE — TELEPHONE ENCOUNTER
Spoke to the patient.  She complains of fluttering sensation every once in awhile.  She has not had any episodes recently up until today.  Her episodes started about an hour ago most likely due to.increased stress today.  I let her know that she was last seen June 2021.  We are unable to send prescription over to the pharmacy as she has not been seen in the office with an EKG.  I have her scheduled to see Raciel on Monday at 3 PM.

## 2023-09-06 ENCOUNTER — OFFICE VISIT (OUTPATIENT)
Dept: CARDIOLOGY | Facility: CLINIC | Age: 53
End: 2023-09-06
Payer: COMMERCIAL

## 2023-09-06 VITALS
BODY MASS INDEX: 38.24 KG/M2 | SYSTOLIC BLOOD PRESSURE: 138 MMHG | HEART RATE: 77 BPM | OXYGEN SATURATION: 97 % | WEIGHT: 224 LBS | DIASTOLIC BLOOD PRESSURE: 80 MMHG | HEIGHT: 64 IN

## 2023-09-06 DIAGNOSIS — I47.10 PAROXYSMAL SUPRAVENTRICULAR TACHYCARDIA (CMS/HCC): Primary | ICD-10-CM

## 2023-09-06 DIAGNOSIS — I45.6 WOLFF-PARKINSON-WHITE (WPW) SYNDROME: ICD-10-CM

## 2023-09-06 DIAGNOSIS — I10 ESSENTIAL HYPERTENSION: ICD-10-CM

## 2023-09-06 DIAGNOSIS — E66.812 CLASS 2 SEVERE OBESITY WITH SERIOUS COMORBIDITY AND BODY MASS INDEX (BMI) OF 38.0 TO 38.9 IN ADULT, UNSPECIFIED OBESITY TYPE (CMS/HCC): ICD-10-CM

## 2023-09-06 DIAGNOSIS — E66.01 CLASS 2 SEVERE OBESITY WITH SERIOUS COMORBIDITY AND BODY MASS INDEX (BMI) OF 38.0 TO 38.9 IN ADULT, UNSPECIFIED OBESITY TYPE (CMS/HCC): ICD-10-CM

## 2023-09-06 DIAGNOSIS — I05.9 MITRAL VALVE DISORDER: ICD-10-CM

## 2023-09-06 PROCEDURE — 3008F BODY MASS INDEX DOCD: CPT | Performed by: INTERNAL MEDICINE

## 2023-09-06 PROCEDURE — 93000 ELECTROCARDIOGRAM COMPLETE: CPT | Performed by: INTERNAL MEDICINE

## 2023-09-06 PROCEDURE — 3075F SYST BP GE 130 - 139MM HG: CPT | Performed by: INTERNAL MEDICINE

## 2023-09-06 PROCEDURE — 99214 OFFICE O/P EST MOD 30 MIN: CPT | Performed by: INTERNAL MEDICINE

## 2023-09-06 PROCEDURE — 3079F DIAST BP 80-89 MM HG: CPT | Performed by: INTERNAL MEDICINE

## 2023-09-06 RX ORDER — FLUOXETINE 20 MG/1
20 TABLET ORAL DAILY
COMMUNITY
Start: 2023-08-14 | End: 2023-09-27 | Stop reason: DRUGHIGH

## 2023-09-06 RX ORDER — METOPROLOL TARTRATE 25 MG/1
25 TABLET, FILM COATED ORAL 2 TIMES DAILY PRN
Qty: 90 TABLET | Refills: 3 | Status: SHIPPED | OUTPATIENT
Start: 2023-09-06

## 2023-09-06 NOTE — ASSESSMENT & PLAN NOTE
She has intermittent episodes of palpitations associated with stress.   No recurrence of tachycardia after undergoing catheter mediated ablation procedure in 1996.

## 2023-09-06 NOTE — PROGRESS NOTES
Larissa Galdamez MD, [facility]  Cardiac Electrophysiology    [facility]    [facility]  [facility] [address]  [address] [place] [address]    TEL  [phone]  [facility]     e ID: [id number] [id number]                 Electrophysiology Progress Note                        September 06, 2023  HPI:  Taryn Virgen is a 52 y.o. female who comes to the office today for a follow up for a history of supraventricular tachycardia status post catheter mediated ablation procedure for WPW and for AVNRT and a history of hypertension. She was accompanied by her mother and her young daughter.    At her last telemedicine visit on 06/16/2021, she reported that 2 weeks ago she had one isolated episode of palpitations occurring in the middle of the night, lasting about 20 minutes.    Today, she reports that she has been dealing with significant amount of personal stress as her 24-year old son who was recently diagnosed with multiple sclerosis (MS) and also her mother undergoing for the GI cancer treatment. Patient continues to experience the episodes of heart palpitations mostly triggers by her personal stress. She reported that she requires a new prescriptions for metoprolol tartrate 25-mg P.O PRN. She is concerns about her weight as she is having the problem with weight management. She started the Prozac 20-mg P.O once daily. She has no smoking history. She sill staying at home but trying to find a job. No dizziness, near syncope or syncope. No chest pain. No dyspnea on exertion, PND, orthopnea or pedal edema.     PROBLEM LIST:  1. Hypertension.  2. Supraventricular tachycardia status post ablation of a left-sided atrioventricular accessory  pathway in 1996.  3. Catheter ablation in 2009 for inducible atrioventricular node reentrant tachycardia.  4. Obesity.  5. Anxiety and depression secondary to significant stress in her personal life.  6.  Vasovagal symptoms during  pregnancy.    Patient Active Problem List   Diagnosis    Mitral valve disorder    Paroxysmal supraventricular tachycardia    Edema    Dizziness    Gloria-Parkinson-White (WPW) syndrome    Essential hypertension    Fatty liver disease, nonalcoholic    Class 2 severe obesity with serious comorbidity and body mass index (BMI) of 38.0 to 38.9 in adult     Mixed hyperlipidemia     Past Medical History:   Diagnosis Date    Anxiety     Arrhythmia     Back pain Yrs ago    Depression     Fatty liver 3 yrs ago    History of snoring Months ago    Joint pain Years ago      Past Surgical History:   Procedure Laterality Date    BREAST BIOPSY      REDUCTION MAMMAPLASTY  2007    VAGINAL HYSTERECTOMY  04/2016      CURRENT MEDICATIONS:  Current Outpatient Medications   Medication Sig Dispense Refill    hydrochlorothiazide (HYDRODIURIL) 25 mg tablet Take 1 tablet by oral route once daily as needed. (Patient taking differently: Take 25 mg by mouth daily. Take 1 tablet by oral route once daily as needed.) 30 tablet 1    metoprolol tartrate (LOPRESSOR) 25 mg tablet Take 1 tablet (25 mg total) by mouth 2 (two) times a day as needed (palpitations or high blood pressue). 90 tablet 3    FLUoxetine (PROzac) 40 mg capsule Take 40 mg by mouth daily.      semaglutide, weight loss, (WEGOVY) 0.25 mg/0.5 mL subcutaneous injection Inject 0.25 mg under the skin every (seven) 7 days. 2 mL 0     No current facility-administered medications for this visit.     ALLERGIES: Sulfa.    SOCIAL HISTORY: She is  and lives with her  and younger daughter. She has stepsons.  No history of cigarette smoking.  She drinks alcohol on social occasions.  Her mother who accompany her in the office today was diagnosed with cancer last this past year but is doing very well in remission.    REVIEW OF SYSTEMS:   As in HPI.  All other other systems were reviewed and are negative.    Constitution: Negative for fever and malaise/fatigue.  "    Vitals:    09/06/23 1018   BP: 138/80   BP Location: Right upper arm   Patient Position: Sitting   Pulse: 77   SpO2: 97%   Weight: 102 kg (224 lb)   Height: 1.626 m (5' 4\")      PHYSICAL EXAMINATION: Healthy-appearing young woman in no acute distress.  Neck revealed no jugular venous distention.  No carotid bruits.  Lungs were clear to  auscultation. No rales or wheezes. Cardiovascular exam revealed normal S1, S2, regular rhythm  with no murmurs, gallops or rubs. Abdomen was soft and nontender.  Obese.  No masses. Extremities  revealed no edema. Alert and oriented x 3, no focal deficits to gross neurologic exam.  Skin: No lesions.  Psychiatric normal mood and affect.    DATA REVIEW:    ECG 09/06/2023: I personally reviewed her 12-lead EKG performed today which reveals Normal sinus rhythm   Right superior axis deviation   Nonspecific T wave abnormality      ECG 6/16/2021: I personally reviewed her 12-lead EKG performed today which reveals   Sinus  Rhythm   Nonspecific T-abnormality.   No ventricular preexcitation.    Laboratory data from June 21, 2017 which included a CBC, comprehensive metabolic panel, magnesium level and TSH level were normal except for very mild elevation of liver function tests.    IMPRESSION/RECOMMENDATIONS:    Assessment/Plan      Gloria-Parkinson-White (WPW) syndrome  She has intermittent episodes of palpitations associated with stress.   No recurrence of tachycardia after undergoing catheter mediated ablation procedure in 1996.    Paroxysmal supraventricular tachycardia (CMS/HCC)  No recurrence of supraventricular tachycardia after undergoing catheter mediated ablation for atrioventricular nannette reentry tachycardia and also for AV accessory pathway mediated tachycardia (WPW).  She has a history of brief palpitations associated with stress.  I refilled her prescription for metoprolol tartrate to be taken only as needed for episodes of palpitations.    Essential hypertension  Her blood " pressure is   controlled in the office today.  She reports that her blood pressure remains controlled on low-dose diuretic therapy and is low normal. If she continues to lose weight; she may not need antihypertensive medications.   She did not get the blood tests which I ordered in November 2020.  She has not had blood tests in at least 2 years.    She agrees to get  routine blood tests.    Mitral valve disorder  She has not had an echcocardiogram years and she did not have the echocardiogram I previously ordered due to COVID-19 pandemic. She is asymptomatic.   I will reorder an echcocardiogram.    Class 2 severe obesity with serious comorbidity and body mass index (BMI) of 38.0 to 38.9 in adult   The patient wants to lose weight and her attempts have not been successful.  I advised her to be enrolled in [facility] weight management clinic.  There is no contraindication for her to take weight loss medications.    Return in about 6 months (around 3/6/2024).    I will contact the patient with the results of blood test including CBC, CMP, TSH, lipid panel upon completion.   Thank you for allowing me to participate in the care of your patient.  Please do not hesitate to contact me if you have any questions regarding my recommendations and management.    Sincerely;    Larissa Elias M.D., FACC , FACP    This document was generated utilizing voice recognition technology. A reasonable attempt at proofreading has been made to minimize errors but please excuse any typographical errors which may be present. Please call with any questions.        By signing my name below, IJenifer, attest that this documentation has been prepared under the direction and in the presence of Larissa Galdamez MD Electronically signed: Lucio Patel. 9/6/2023 10:22 AM     Larissa ORDONEZ MD, personally performed the services described in this documentation. All medical record entries made by the lucio were at my direction  and in my presence. I have reviewed the chart and agree that the record reflects my personal performance and is accurate and complete. Larissa Galdamez MD. 9/6/2023. 10:22 AM.

## 2023-09-06 NOTE — ASSESSMENT & PLAN NOTE
Her blood pressure is   controlled in the office today.  She reports that her blood pressure remains controlled on low-dose diuretic therapy and is low normal. If she continues to lose weight; she may not need antihypertensive medications.   She did not get the blood tests which I ordered in November 2020.  She has not had blood tests in at least 2 years.    She agrees to get  routine blood tests.

## 2023-09-06 NOTE — ASSESSMENT & PLAN NOTE
She has not had an echcocardiogram years and she did not have the echocardiogram I previously ordered due to COVID-19 pandemic. She is asymptomatic.   I will reorder an echcocardiogram.

## 2023-09-06 NOTE — ASSESSMENT & PLAN NOTE
No recurrence of supraventricular tachycardia after undergoing catheter mediated ablation for atrioventricular nannette reentry tachycardia and also for AV accessory pathway mediated tachycardia (WPW).  She has a history of brief palpitations associated with stress.  I refilled her prescription for metoprolol tartrate to be taken only as needed for episodes of palpitations.

## 2023-09-14 ENCOUNTER — TELEPHONE (OUTPATIENT)
Dept: CARDIOLOGY | Facility: CLINIC | Age: 53
End: 2023-09-14
Payer: COMMERCIAL

## 2023-09-14 LAB
ALBUMIN SERPL-MCNC: 4.5 G/DL (ref 3.9–4.9)
ALBUMIN/GLOB SERPL: 1.7 {RATIO} (ref 1.2–2.2)
ALP SERPL-CCNC: 70 IU/L (ref 44–121)
ALT SERPL-CCNC: 28 IU/L (ref 0–32)
AST SERPL-CCNC: 21 IU/L (ref 0–40)
BASOPHILS # BLD AUTO: 0 X10E3/UL (ref 0–0.2)
BASOPHILS NFR BLD AUTO: 0 %
BILIRUB SERPL-MCNC: 0.5 MG/DL (ref 0–1.2)
BUN SERPL-MCNC: 18 MG/DL (ref 6–24)
BUN/CREAT SERPL: 21 (ref 9–23)
CALCIUM SERPL-MCNC: 9.8 MG/DL (ref 8.7–10.2)
CHLORIDE SERPL-SCNC: 102 MMOL/L (ref 96–106)
CHOLEST SERPL-MCNC: 209 MG/DL (ref 100–199)
CO2 SERPL-SCNC: 25 MMOL/L (ref 20–29)
CREAT SERPL-MCNC: 0.87 MG/DL (ref 0.57–1)
EGFRCR SERPLBLD CKD-EPI 2021: 81 ML/MIN/1.73
EOSINOPHIL # BLD AUTO: 0.3 X10E3/UL (ref 0–0.4)
EOSINOPHIL NFR BLD AUTO: 4 %
ERYTHROCYTE [DISTWIDTH] IN BLOOD BY AUTOMATED COUNT: 12.3 % (ref 11.7–15.4)
GLOBULIN SER CALC-MCNC: 2.6 G/DL (ref 1.5–4.5)
GLUCOSE SERPL-MCNC: 89 MG/DL (ref 70–99)
HCT VFR BLD AUTO: 40.4 % (ref 34–46.6)
HDLC SERPL-MCNC: 47 MG/DL
HGB BLD-MCNC: 13.9 G/DL (ref 11.1–15.9)
IMM GRANULOCYTES # BLD AUTO: 0 X10E3/UL (ref 0–0.1)
IMM GRANULOCYTES NFR BLD AUTO: 0 %
LDLC SERPL CALC-MCNC: 135 MG/DL (ref 0–99)
LYMPHOCYTES # BLD AUTO: 1.7 X10E3/UL (ref 0.7–3.1)
LYMPHOCYTES NFR BLD AUTO: 23 %
MCH RBC QN AUTO: 31 PG (ref 26.6–33)
MCHC RBC AUTO-ENTMCNC: 34.4 G/DL (ref 31.5–35.7)
MCV RBC AUTO: 90 FL (ref 79–97)
MONOCYTES # BLD AUTO: 0.4 X10E3/UL (ref 0.1–0.9)
MONOCYTES NFR BLD AUTO: 6 %
NEUTROPHILS # BLD AUTO: 4.9 X10E3/UL (ref 1.4–7)
NEUTROPHILS NFR BLD AUTO: 67 %
PLATELET # BLD AUTO: 188 X10E3/UL (ref 150–450)
POTASSIUM SERPL-SCNC: 3.7 MMOL/L (ref 3.5–5.2)
PROT SERPL-MCNC: 7.1 G/DL (ref 6–8.5)
RBC # BLD AUTO: 4.48 X10E6/UL (ref 3.77–5.28)
SODIUM SERPL-SCNC: 145 MMOL/L (ref 134–144)
T4 FREE SERPL-MCNC: 1.29 NG/DL (ref 0.82–1.77)
TRIGL SERPL-MCNC: 149 MG/DL (ref 0–149)
TSH SERPL DL<=0.005 MIU/L-ACNC: 1.5 UIU/ML (ref 0.45–4.5)
VLDLC SERPL CALC-MCNC: 27 MG/DL (ref 5–40)
WBC # BLD AUTO: 7.3 X10E3/UL (ref 3.4–10.8)

## 2023-09-14 NOTE — TELEPHONE ENCOUNTER
Please call the patient. I reviewed her blood tests. Her cholesterol is mildly elevated. Tell her to continue to work on a low carbohydrate,  Low saturated fat,Mediterranean diet and weight loss. Her sodium is mildly elevated, needs more hydration. Other labs are normal.

## 2023-09-19 NOTE — PROGRESS NOTES
Questions YES NO   1 During the last 3 months, did you have any episodes of excessive overeating (i.e. eating significantly more than what most people would eat in a similar period)?    x   NOTE: IF YOU ANSWERED NO TO QUESTION 1, YOU MAY STOP. THE REMAINING QUESTIONS DO NOT APPLY TO YOU

## 2023-09-25 PROBLEM — E66.812 CLASS 2 SEVERE OBESITY WITH SERIOUS COMORBIDITY AND BODY MASS INDEX (BMI) OF 38.0 TO 38.9 IN ADULT (CMS/HCC): Status: ACTIVE | Noted: 2023-09-25

## 2023-09-25 PROBLEM — E66.01 CLASS 2 SEVERE OBESITY WITH SERIOUS COMORBIDITY AND BODY MASS INDEX (BMI) OF 38.0 TO 38.9 IN ADULT (CMS/HCC): Status: ACTIVE | Noted: 2023-09-25

## 2023-09-25 ASSESSMENT — ENCOUNTER SYMPTOMS
SLEEP DISTURBANCE: 0
NAUSEA: 0
CONSTIPATION: 0
NERVOUS/ANXIOUS: 0
CHILLS: 0
DIARRHEA: 0
FATIGUE: 1
APPETITE CHANGE: 0
SORE THROAT: 0
DYSPHORIC MOOD: 0
HEADACHES: 0
PALPITATIONS: 0
TROUBLE SWALLOWING: 0
LIGHT-HEADEDNESS: 0
ARTHRALGIAS: 1
FEVER: 0
COLOR CHANGE: 0
VOMITING: 0
COUGH: 0
ACTIVITY CHANGE: 0
ABDOMINAL PAIN: 0
SHORTNESS OF BREATH: 1
UNEXPECTED WEIGHT CHANGE: 0
DIZZINESS: 0
EYE ITCHING: 1

## 2023-09-25 NOTE — PROGRESS NOTES
DETAILS OF CONSULTATION     Consulting Service:  [facility]    Referring Physician: Willaim Lozoya MD    Reason for Consultation:   Chief Complaint   Patient presents with    Establish Care        HISTORY OF PRESENT ILLNESS        Taryn Virgen is a 52 y.o. female here to discuss how treatment of obesity can improve their weight related conditions.      Taryn Virgen identifies a personal healthy weight as 135-140 pounds and she last weighed that amount many years ago.     Motivation for appointment: She was referred to our office by her cardiologist to help her lose weight. She does not feel well at her current weight.     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  Form personally reviewed by me prior to visit and  with the patient.     I have reviewed the patient's past medical and surgical history, family history, psychiatric, and social history.     Pertinent negative history: None  Patient denies history of anorexia, bulimia, addiction, ADHD, seizures, acute angle glaucoma, pancreatitis,  Coronary Artery Disease, valvular disease, cardiomyopathy, chronic renal disease, nephrolithiasis, disorders of electrolyte imbalance    Pertinent positive history not already mentioned: None    PAST MEDICAL AND SURGICAL HISTORY        Past Medical History:   Diagnosis Date    Anxiety     Arrhythmia     Back pain Yrs ago    Depression     Fatty liver 3  yrs ago    History of snoring Months ago    Joint pain Years ago       Past Surgical History:   Procedure Laterality Date    BREAST BIOPSY      REDUCTION MAMMAPLASTY  2007    VAGINAL HYSTERECTOMY  04/2016       PCP: William Lozoya    MEDICATIONS          Current Outpatient Medications:     FLUoxetine (PROzac) 40 mg capsule, Take 40 mg by mouth daily., Disp: , Rfl:     hydrochlorothiazide (HYDRODIURIL) 25 mg tablet, Take 1 tablet by oral route once daily as needed. (Patient taking differently: Take 25 mg by mouth daily. Take 1 tablet by oral route once daily as needed.), Disp: 30 tablet, Rfl: 1    metoprolol tartrate (LOPRESSOR) 25 mg tablet, Take 1 tablet (25 mg total) by mouth 2 (two) times a day as needed (palpitations or high blood pressue)., Disp: 90 tablet, Rfl: 3    ALLERGIES        Sulfa (sulfonamide antibiotics), Sulfacetamide sodium-sulfur, and Sulfamethoxazole-trimethoprim    FAMILY HISTORY        Family History   Problem Relation Age of Onset    Hypertension Biological Mother     Cancer Biological Mother     Hypertension Biological Father     Kidney disease Biological Father     No Known Problems Biological Sister     No Known Problems Biological Brother     Heart failure Paternal Grandmother     Stroke Paternal Grandfather     Heart disease Maternal Grandfather     Stroke Maternal Grandfather     Obesity Maternal Grandmother        SOCIAL/ TOBACCO HISTORY        Social History     Tobacco Use    Smoking status: Never    Smokeless tobacco: Never   Vaping Use    Vaping Use: Never used   Substance Use Topics    Alcohol use: Yes     Alcohol/week: 2.0 standard drinks of alcohol     Types: 2 Glasses of wine per week     Comment: Cant now with my medication    Drug use: Never     Social History     Social History Narrative    Not on file       REVIEW OF SYSTEMS      Review of Systems    Review of Systems   Constitutional: Positive for fatigue. Negative for activity change, appetite  "change, chills, fever and unexpected weight change.   HENT: Negative for sore throat and trouble swallowing.    Eyes: Positive for itching.   Respiratory: Positive for shortness of breath. Negative for cough.    Cardiovascular: Positive for leg swelling. Negative for chest pain and palpitations.   Gastrointestinal: Negative for abdominal pain, constipation, diarrhea, nausea and vomiting.   Endocrine: Negative for cold intolerance and heat intolerance.   Musculoskeletal: Positive for arthralgias.   Skin: Negative for color change and rash.   Neurological: Negative for dizziness, light-headedness and headaches.   Psychiatric/Behavioral: Negative for dysphoric mood and sleep disturbance. The patient is not nervous/anxious.         PHYSICAL EXAMINATION      Visit Vitals  /88 (BP Location: Right upper arm, Patient Position: Sitting)   Pulse 94   Ht 1.626 m (5' 4\")   Wt 102 kg (225 lb 3 oz)   SpO2 96%   BMI 38.65 kg/m²        Physical Exam  Vitals reviewed.   Constitutional:       General: She is not in acute distress.     Appearance: Normal appearance. She is obese. She is not ill-appearing.   HENT:      Head: Normocephalic and atraumatic.   Cardiovascular:      Rate and Rhythm: Normal rate.   Pulmonary:      Effort: Pulmonary effort is normal.   Abdominal:      General: There is distension.   Musculoskeletal:         General: Normal range of motion.      Cervical back: Normal range of motion.   Skin:     General: Skin is warm and dry.   Neurological:      Mental Status: She is alert and oriented to person, place, and time.   Psychiatric:         Mood and Affect: Mood normal.         Behavior: Behavior normal.         Thought Content: Thought content normal.         Judgment: Judgment normal.           LABS / IMAGING / EKG        Labs  I have reviewed the patient's pertinent labs.  No results found for: \"HGBA1C\"  Lab Results   Component Value Date    CHOL 209 (H) 09/13/2023     Lab Results   Component Value Date    " "HDL 47 09/13/2023     Lab Results   Component Value Date    LDLCALC 135 (H) 09/13/2023     Lab Results   Component Value Date    TRIG 149 09/13/2023     No results found for: \"CHOLHDL\"  Lab Results   Component Value Date    TSH 1.500 09/13/2023     Lab Results   Component Value Date    WBC 7.3 09/13/2023    HGB 13.9 09/13/2023    HCT 40.4 09/13/2023    MCV 90 09/13/2023     09/13/2023     Lab Results   Component Value Date     (H) 09/13/2023    K 3.7 09/13/2023     09/13/2023    CO2 25 09/13/2023    BUN 18 09/13/2023    CREATININE 0.87 09/13/2023    GLUCOSE 89 09/13/2023    AST 21 09/13/2023    ALT 28 09/13/2023    ALKPHOS 70 09/13/2023    PROTEIN 7.1 09/13/2023    ALBUMIN 4.5 09/13/2023    BILITOT 0.5 09/13/2023    EGFR 81 09/13/2023      ASSESSMENT AND PLAN   Essential hypertension  High blood pressure can come from various reasons.  Excess weight can worsen and sometimes be the cause of high blood pressure through putting pressure on the kidneys through mechanical forces as well as increasing body inflammation and promoting water retention.    Gloria-Parkinson-White (WPW) syndrome  Weight reduction recommended a treatment strategy.      Mixed hyperlipidemia  Elevated cholesterol levels often come from a blend of genetic and environmental factors.  Maximizing nutrition and physical activity is an important adjunct to medical management to treat elevated cholesterol levels and reduce the risk of arthrosclerotic disease such as heart attacks, strokes, and peripheral arterial disease.      Class 2 severe obesity with serious comorbidity and body mass index (BMI) of 38.0 to 38.9 in adult (CMS/[facility])  Obesity class 2    Weight loss through a multifaceted approach addressing metabolic factors, nutrition, physical activity, and mental well being will help this patient improve or resolve the following conditions related to mechanical forces of weight: back and knee pain, as well as the following conditions " "related to metabolic and inflammatory processes of excess and dysfunctional adipose tissue or \"adiposopathy\": hypertension, SVT.     We discussed the concept of Obesity as a chronic disease requiring a long term treatment plan.     Understand that weight loss through sustainable changes will probably not provide quick results, but will provide lasting results.  Regaining rapidly lost weight is more damaging than never loosing it at all.     We discussed the various treatment strategies:  Medically significant and beneficial weight loss starts with a 5% weight reduction, and can prevent many weight related conditions from worsening.   5 to 10% body weight loss can often be achieved through nutrition counseling, physical activity, and appropriate sleep and stress management.  Weight loss above 10% and up to 20% can often help reduce many weight related conditions, but often requires the assistance of long-term medication due to the metabolic adaptation that can occur with more than 10% body weight loss.  Greater than 20% body weight loss can resolve many obesity related health complications.  Surgical treatment strategies are often needed to achieve a greater than 20% body weight loss.     Today we discussed that weight balance is a complex process with many possible risk factors that far exceed the traditional concept of caloric balance. We identified your specific risk factors and began a plan to make sustainable life changes.  Lasting change come from new habit formation.  This takes time and consistent effort.      After today's visit, we determined that contributing factors to the above weight related conditions include:     RF and Comorbidities: Hypertension, NAFLD, mitral valve disorder, SVT, Gloria-Parkinson-White syndrome, yoyo weight, weight positive medication (metoprolol), after dinner eating, artificial sweeteners, insufficient water, emotional eating (stressed), mindless eating (social, refuse not), " distracted eating (cooking, screen), disordered eating (skips meals, food cravings), high stress.      Today we outlined the following beginning steps to put goals into action:    Nutrition: Start with the basics!  Maintain at least a 12 hour fast daily.    Eat protein with every balanced meal and use protein as a snack for hunger.  Aim to drink 64 ounces of water or more every day.   Keep a food journal for awareness-the most successful patients keep a journal.  Get counseling from a registered dietitian to help make your plan realistic and sustainable.    Please use this link to access education on obesity as a medical condition, metabolic adaptation, insulin resistance, the basics of treatment, and medications for the management of overweight and obesity.     Each video in this series is 10 minutes or less.      [email or web address]      Activity:   Long Term Activity Goals:   30 minutes of dedicated exercise daily and 5 minutes of movement every hour.    Resistance (Strength) training is extremely important to prevent muscle loss during weight loss, please plan to do 30 minutes of strength training 2 times weekly.    Track activity data with a fitness watch.      Starting activity goal:  Hourly movement - get up every hour and move around for at least 5 minutes.     Wellbeing: Sleep, stress, and emotional health.     10 minutes of a dedicated relaxation technique daily will help improve cortisol levels and reduce the weight positive effects of stress.      Proper sleep is important for weight regulation.  Aim to sleep 7 or more hours each night.      Medical management: today we discussed Wegovy and you are aware it is unavailable at this time. Keep in touch with me about availability.     Wegovy will likely need a prior authorization in order to be filled.    Co pays vary widely and also depend on your deductible.    If prior auth is needed, the pharmacy usually starts it and my office  "will complete it.     Please reach out on the portal if you do not hear from our office in 1 week regarding the status of your prior authorizations.     Please go to [email or web address] to review how to use the pen, apply for co-pay discounts and sign up for the \"We [employer] Together\" online coaching support tool.      Storage instructions: Wegovy must be stored in the refrigerator.      Prescription: There is 1 dose per pen.  There are 4 pens per month.     You must drink 64 ounces of water daily because dehydration can be harmful to your kidneys.     Injection sites:  Injection sites include the back of the arms, the lower abdomen, or the upper thighs.  Please rotate sites weekly.    Month 1: Inject 0.25 mg subcutaneous weekly  Month 2: Inject 0.5 mg subcutaneous weekly  Month 3: Inject 1 mg subcutaneous weekly  Month 4: Inject 1.7 mg subcutaneous weekly  Month 5: Inject 2.4 mg subcutaneous weekly thereafter    Most common side effects for this medication include mild nausea and constipation.     If you develop nausea, do not increase to the next higher dose until nausea has resolved.  Mild nausea is not a reason to stop the medication.  When you require a refill, please contact the office at least 5 days in advance and let us know how you are tolerating your current injection.  This will allow us to determine if we increase your prescription or repeat the same dose for another month.    If you develop slow bowels, the best defense is increasing your water intake and adding a fiber supplement like psyllium husk (Konsyl) to your daily regimen.  You could also use MiraLAX 1 capful daily or dulcosate (colace or dulcolax)  as a stool softener 1-2 tabs twice daily.  If you go 3 days without a bowel movement, please use milk of magnesium 30 ml every 12 hours for 3 doses or until BM.     Who should not take this medication: nursing, pregnant or planning to become pregnant, have a history of pancreatitis, or have a rare thyroid tumor " called a medullary tumor personally or in your family history.     If you develop severe abdominal pain while taking this medication, please discontinue the medication and contact our office.    In mouse models, this medication induced a rare form of thyroid tumor called a medullary thyroid tumor.  While we have not seen this happen in humans in the past 10 years of using this type of medication, anyone with a family history of this rare thyroid tumor, should not use this medication.  Please let us know if you develop any difficulty swallowing, or voice changes while on this medication as this could be a sign of a problem developing with your thyroid.          You are required to meet with your team monthly in the first 6 months in order to receive prescriptions for weight loss medications.  Medication without sustainable change will not lead to long term weight loss.  We will support a weight loss strategy that has short term gains at the expense of long term harm.     Notes about supplements:  Probiotics: Consume foods with probiotics or take a probiotic daily (like Udo's Adult Probiotic, VSL-3, Florastor, or Culturelle).   Omega-3: Take a good quality omega -3 supplement (ex:[employer] - sold here, [employer], [employer]elum, [employer]) or eat 2 servings of fatty fish weekly.   Vitamin D 3: 2,000 IU daily (or more if deficient) with a meal containing fat for best absorption  Magnesium: 200 mg taken at night can be very useful for constipation.  Use as needed.    Commit to follow up --- loose, gain or stay the same.  Studies show that the most successful patients meet with their providers every 2-4 weeks. Change will happen if you continue to manage and modify your plan.  We are here to help you do that no matter what happens in the interim. When you are executing your plan well, we will add to your plan.  When you are struggling, that is when you need your appointments the most. Cancelling or rescheduling  appointments to give yourself time to make changes is counterproductive.      In Conclusion:     Our plan of action includes frequent visits over the next year to focus on education regarding the appropriate nutrition and physical activity best suited to our patient's individual needs, SMART goal setting and other strategies for lifestyle change, mindfulness, stress reduction,  accountability, and review of any initiated medical interventions.  Visits will space out over time with formation of new life habits and stabilization of the treatment plan. We will enlist the services of the nutritionist, and may in the future consult with exercise physiology or emotional wellness.      Pre-chart/Chart Prep: 5 minutes  Face to Face time: 45 minutes  Chart Completion: 10 minutes    I spent 60 minutes on this date of service performing the following activities: obtaining history, performing examination, entering orders, documenting, preparing for visit, obtaining / reviewing records, and providing counseling and education.          TANA Massey  9/27/2023     Thank you very much for allowing us to participate in the care of your patient. Please do not hesitate to call or e-mail if there are any questions.

## 2023-09-27 ENCOUNTER — OFFICE VISIT (OUTPATIENT)
Dept: BARIATRICS/WEIGHT MGMT | Facility: CLINIC | Age: 53
End: 2023-09-27
Payer: COMMERCIAL

## 2023-09-27 VITALS
BODY MASS INDEX: 38.44 KG/M2 | HEART RATE: 94 BPM | HEIGHT: 64 IN | OXYGEN SATURATION: 96 % | SYSTOLIC BLOOD PRESSURE: 132 MMHG | WEIGHT: 225.19 LBS | DIASTOLIC BLOOD PRESSURE: 88 MMHG

## 2023-09-27 DIAGNOSIS — E66.01 CLASS 2 SEVERE OBESITY WITH SERIOUS COMORBIDITY AND BODY MASS INDEX (BMI) OF 38.0 TO 38.9 IN ADULT, UNSPECIFIED OBESITY TYPE (CMS/HCC): ICD-10-CM

## 2023-09-27 DIAGNOSIS — I10 ESSENTIAL HYPERTENSION: Primary | ICD-10-CM

## 2023-09-27 DIAGNOSIS — E66.812 CLASS 2 SEVERE OBESITY WITH SERIOUS COMORBIDITY AND BODY MASS INDEX (BMI) OF 38.0 TO 38.9 IN ADULT, UNSPECIFIED OBESITY TYPE (CMS/HCC): ICD-10-CM

## 2023-09-27 DIAGNOSIS — I45.6 WOLFF-PARKINSON-WHITE (WPW) SYNDROME: ICD-10-CM

## 2023-09-27 DIAGNOSIS — E78.2 MIXED HYPERLIPIDEMIA: ICD-10-CM

## 2023-09-27 PROCEDURE — 3008F BODY MASS INDEX DOCD: CPT | Performed by: NURSE PRACTITIONER

## 2023-09-27 PROCEDURE — 3075F SYST BP GE 130 - 139MM HG: CPT | Performed by: NURSE PRACTITIONER

## 2023-09-27 PROCEDURE — 99205 OFFICE O/P NEW HI 60 MIN: CPT | Performed by: NURSE PRACTITIONER

## 2023-09-27 PROCEDURE — 3079F DIAST BP 80-89 MM HG: CPT | Performed by: NURSE PRACTITIONER

## 2023-09-27 RX ORDER — FLUOXETINE HYDROCHLORIDE 40 MG/1
40 CAPSULE ORAL DAILY
COMMUNITY
Start: 2023-09-07

## 2023-09-27 NOTE — PATIENT INSTRUCTIONS
"Problem List Items Addressed This Visit       Gloria-Parkinson-White (WPW) syndrome     Weight reduction recommended a treatment strategy.           Mixed hyperlipidemia     Elevated cholesterol levels often come from a blend of genetic and environmental factors.  Maximizing nutrition and physical activity is an important adjunct to medical management to treat elevated cholesterol levels and reduce the risk of arthrosclerotic disease such as heart attacks, strokes, and peripheral arterial disease.           Essential hypertension - Primary     High blood pressure can come from various reasons.  Excess weight can worsen and sometimes be the cause of high blood pressure through putting pressure on the kidneys through mechanical forces as well as increasing body inflammation and promoting water retention.         Class 2 severe obesity with serious comorbidity and body mass index (BMI) of 38.0 to 38.9 in adult (CMS/[facility])     Obesity class 2    Weight loss through a multifaceted approach addressing metabolic factors, nutrition, physical activity, and mental well being will help this patient improve or resolve the following conditions related to mechanical forces of weight: back and knee pain, as well as the following conditions related to metabolic and inflammatory processes of excess and dysfunctional adipose tissue or \"adiposopathy\": hypertension, SVT.     We discussed the concept of Obesity as a chronic disease requiring a long term treatment plan.     Understand that weight loss through sustainable changes will probably not provide quick results, but will provide lasting results.  Regaining rapidly lost weight is more damaging than never loosing it at all.     We discussed the various treatment strategies:  Medically significant and beneficial weight loss starts with a 5% weight reduction, and can prevent many weight related conditions from worsening.   5 to 10% body weight loss can often be achieved through nutrition " counseling, physical activity, and appropriate sleep and stress management.  Weight loss above 10% and up to 20% can often help reduce many weight related conditions, but often requires the assistance of long-term medication due to the metabolic adaptation that can occur with more than 10% body weight loss.  Greater than 20% body weight loss can resolve many obesity related health complications.  Surgical treatment strategies are often needed to achieve a greater than 20% body weight loss.     Today we discussed that weight balance is a complex process with many possible risk factors that far exceed the traditional concept of caloric balance. We identified your specific risk factors and began a plan to make sustainable life changes.  Lasting change come from new habit formation.  This takes time and consistent effort.      After today's visit, we determined that contributing factors to the above weight related conditions include:     RF and Comorbidities: Hypertension, NAFLD, mitral valve disorder, SVT, Gloria-Parkinson-White syndrome, yoyo weight, weight positive medication (metoprolol), after dinner eating, artificial sweeteners, insufficient water, emotional eating (stressed), mindless eating (social, refuse not), distracted eating (cooking, screen), disordered eating (skips meals, food cravings), high stress.      Today we outlined the following beginning steps to put goals into action:    Nutrition: Start with the basics!  Maintain at least a 12 hour fast daily.    Eat protein with every balanced meal and use protein as a snack for hunger.  Aim to drink 64 ounces of water or more every day.   Keep a food journal for awareness-the most successful patients keep a journal.  Get counseling from a registered dietitian to help make your plan realistic and sustainable.    Please use this link to access education on obesity as a medical condition, metabolic adaptation, insulin resistance, the basics of treatment, and  "medications for the management of overweight and obesity.     Each video in this series is 10 minutes or less.      [email or web address]      Activity:   Long Term Activity Goals:   30 minutes of dedicated exercise daily and 5 minutes of movement every hour.    Resistance (Strength) training is extremely important to prevent muscle loss during weight loss, please plan to do 30 minutes of strength training 2 times weekly.    Track activity data with a fitness watch.      Starting activity goal:  Hourly movement - get up every hour and move around for at least 5 minutes.     Wellbeing: Sleep, stress, and emotional health.     10 minutes of a dedicated relaxation technique daily will help improve cortisol levels and reduce the weight positive effects of stress.      Proper sleep is important for weight regulation.  Aim to sleep 7 or more hours each night.      Medical management: today we discussed Wegovy and you are aware it is unavailable at this time. Keep in touch with me about availability.     Wegovy will likely need a prior authorization in order to be filled.    Co pays vary widely and also depend on your deductible.    If prior auth is needed, the pharmacy usually starts it and my office will complete it.     Please reach out on the portal if you do not hear from our office in 1 week regarding the status of your prior authorizations.     Please go to [email or web address] to review how to use the pen, apply for co-pay discounts and sign up for the \"We Go Together\" online coaching support tool.      Storage instructions: Wegovy must be stored in the refrigerator.      Prescription: There is 1 dose per pen.  There are 4 pens per month.     You must drink 64 ounces of water daily because dehydration can be harmful to your kidneys.     Injection sites:  Injection sites include the back of the arms, the lower abdomen, or the upper thighs.  Please rotate sites weekly.    Month 1: Inject 0.25 mg " subcutaneous weekly  Month 2: Inject 0.5 mg subcutaneous weekly  Month 3: Inject 1 mg subcutaneous weekly  Month 4: Inject 1.7 mg subcutaneous weekly  Month 5: Inject 2.4 mg subcutaneous weekly thereafter    Most common side effects for this medication include mild nausea and constipation.     If you develop nausea, do not increase to the next higher dose until nausea has resolved.  Mild nausea is not a reason to stop the medication.  When you require a refill, please contact the office at least 5 days in advance and let us know how you are tolerating your current injection.  This will allow us to determine if we increase your prescription or repeat the same dose for another month.    If you develop slow bowels, the best defense is increasing your water intake and adding a fiber supplement like psyllium husk (Konsyl) to your daily regimen.  You could also use MiraLAX 1 capful daily or dulcosate (colace or dulcolax)  as a stool softener 1-2 tabs twice daily.  If you go 3 days without a bowel movement, please use milk of magnesium 30 ml every 12 hours for 3 doses or until BM.     Who should not take this medication: nursing, pregnant or planning to become pregnant, have a history of pancreatitis, or have a rare thyroid tumor called a medullary tumor personally or in your family history.     If you develop severe abdominal pain while taking this medication, please discontinue the medication and contact our office.    In mouse models, this medication induced a rare form of thyroid tumor called a medullary thyroid tumor.  While we have not seen this happen in humans in the past 10 years of using this type of medication, anyone with a family history of this rare thyroid tumor, should not use this medication.  Please let us know if you develop any difficulty swallowing, or voice changes while on this medication as this could be a sign of a problem developing with your thyroid.          You are required to meet with your  team monthly in the first 6 months in order to receive prescriptions for weight loss medications.  Medication without sustainable change will not lead to long term weight loss.  We will support a weight loss strategy that has short term gains at the expense of long term harm.     Notes about supplements:  Probiotics: Consume foods with probiotics or take a probiotic daily (like Udo's Adult Probiotic, VSL-3, Florastor, or Culturelle).   Omega-3: Take a good quality omega -3 supplement (ex:[employer] health - sold here, [employer], [employer], [employer]) or eat 2 servings of fatty fish weekly.   Vitamin D 3: 2,000 IU daily (or more if deficient) with a meal containing fat for best absorption  Magnesium: 200 mg taken at night can be very useful for constipation.  Use as needed.    Commit to follow up --- loose, gain or stay the same.  Studies show that the most successful patients meet with their providers every 2-4 weeks. Change will happen if you continue to manage and modify your plan.  We are here to help you do that no matter what happens in the interim. When you are executing your plan well, we will add to your plan.  When you are struggling, that is when you need your appointments the most. Cancelling or rescheduling appointments to give yourself time to make changes is counterproductive.      In Conclusion:     Our plan of action includes frequent visits over the next year to focus on education regarding the appropriate nutrition and physical activity best suited to our patient's individual needs, SMART goal setting and other strategies for lifestyle change, mindfulness, stress reduction,  accountability, and review of any initiated medical interventions.  Visits will space out over time with formation of new life habits and stabilization of the treatment plan. We will enlist the services of the nutritionist, and may in the future consult with exercise physiology or emotional wellness.      Pre-chart/Chart Prep:  5 minutes  Face to Face time: 45 minutes  Chart Completion: 10 minutes    I spent 60 minutes on this date of service performing the following activities: obtaining history, performing examination, entering orders, documenting, preparing for visit, obtaining / reviewing records, and providing counseling and education.

## 2023-09-27 NOTE — ASSESSMENT & PLAN NOTE
"Obesity class 2    Weight loss through a multifaceted approach addressing metabolic factors, nutrition, physical activity, and mental well being will help this patient improve or resolve the following conditions related to mechanical forces of weight: back and knee pain, as well as the following conditions related to metabolic and inflammatory processes of excess and dysfunctional adipose tissue or \"adiposopathy\": hypertension, SVT.     We discussed the concept of Obesity as a chronic disease requiring a long term treatment plan.     Understand that weight loss through sustainable changes will probably not provide quick results, but will provide lasting results.  Regaining rapidly lost weight is more damaging than never loosing it at all.     We discussed the various treatment strategies:  Medically significant and beneficial weight loss starts with a 5% weight reduction, and can prevent many weight related conditions from worsening.   5 to 10% body weight loss can often be achieved through nutrition counseling, physical activity, and appropriate sleep and stress management.  Weight loss above 10% and up to 20% can often help reduce many weight related conditions, but often requires the assistance of long-term medication due to the metabolic adaptation that can occur with more than 10% body weight loss.  Greater than 20% body weight loss can resolve many obesity related health complications.  Surgical treatment strategies are often needed to achieve a greater than 20% body weight loss.     Today we discussed that weight balance is a complex process with many possible risk factors that far exceed the traditional concept of caloric balance. We identified your specific risk factors and began a plan to make sustainable life changes.  Lasting change come from new habit formation.  This takes time and consistent effort.      After today's visit, we determined that contributing factors to the above weight related " conditions include:     RF and Comorbidities: Hypertension, NAFLD, mitral valve disorder, SVT, Gloria-Parkinson-White syndrome, yoyo weight, weight positive medication (metoprolol), after dinner eating, artificial sweeteners, insufficient water, emotional eating (stressed), mindless eating (social, refuse not), distracted eating (cooking, screen), disordered eating (skips meals, food cravings), high stress.      Today we outlined the following beginning steps to put goals into action:    Nutrition: Start with the basics!  Maintain at least a 12 hour fast daily.    Eat protein with every balanced meal and use protein as a snack for hunger.  Aim to drink 64 ounces of water or more every day.   Keep a food journal for awareness-the most successful patients keep a journal.  Get counseling from a registered dietitian to help make your plan realistic and sustainable.    Please use this link to access education on obesity as a medical condition, metabolic adaptation, insulin resistance, the basics of treatment, and medications for the management of overweight and obesity.     Each video in this series is 10 minutes or less.      [email or web address]      Activity:   Long Term Activity Goals:   30 minutes of dedicated exercise daily and 5 minutes of movement every hour.    Resistance (Strength) training is extremely important to prevent muscle loss during weight loss, please plan to do 30 minutes of strength training 2 times weekly.    Track activity data with a fitness watch.      Starting activity goal:  Hourly movement - get up every hour and move around for at least 5 minutes.     Wellbeing: Sleep, stress, and emotional health.     10 minutes of a dedicated relaxation technique daily will help improve cortisol levels and reduce the weight positive effects of stress.      Proper sleep is important for weight regulation.  Aim to sleep 7 or more hours each night.      Medical management: today we  "discussed Wegovy and you are aware it is unavailable at this time. Keep in touch with me about availability.     Wegovy will likely need a prior authorization in order to be filled.    Co pays vary widely and also depend on your deductible.    If prior auth is needed, the pharmacy usually starts it and my office will complete it.     Please reach out on the portal if you do not hear from our office in 1 week regarding the status of your prior authorizations.     Please go to [email or web address] to review how to use the pen, apply for co-pay discounts and sign up for the \"We Go Together\" online coaching support tool.      Storage instructions: Wegovy must be stored in the refrigerator.      Prescription: There is 1 dose per pen.  There are 4 pens per month.     You must drink 64 ounces of water daily because dehydration can be harmful to your kidneys.     Injection sites:  Injection sites include the back of the arms, the lower abdomen, or the upper thighs.  Please rotate sites weekly.    Month 1: Inject 0.25 mg subcutaneous weekly  Month 2: Inject 0.5 mg subcutaneous weekly  Month 3: Inject 1 mg subcutaneous weekly  Month 4: Inject 1.7 mg subcutaneous weekly  Month 5: Inject 2.4 mg subcutaneous weekly thereafter    Most common side effects for this medication include mild nausea and constipation.     If you develop nausea, do not increase to the next higher dose until nausea has resolved.  Mild nausea is not a reason to stop the medication.  When you require a refill, please contact the office at least 5 days in advance and let us know how you are tolerating your current injection.  This will allow us to determine if we increase your prescription or repeat the same dose for another month.    If you develop slow bowels, the best defense is increasing your water intake and adding a fiber supplement like psyllium husk (Konsyl) to your daily regimen.  You could also use MiraLAX 1 capful daily or dulcosate (colace or dulcolax)  " as a stool softener 1-2 tabs twice daily.  If you go 3 days without a bowel movement, please use milk of magnesium 30 ml every 12 hours for 3 doses or until BM.     Who should not take this medication: nursing, pregnant or planning to become pregnant, have a history of pancreatitis, or have a rare thyroid tumor called a medullary tumor personally or in your family history.     If you develop severe abdominal pain while taking this medication, please discontinue the medication and contact our office.    In mouse models, this medication induced a rare form of thyroid tumor called a medullary thyroid tumor.  While we have not seen this happen in humans in the past 10 years of using this type of medication, anyone with a family history of this rare thyroid tumor, should not use this medication.  Please let us know if you develop any difficulty swallowing, or voice changes while on this medication as this could be a sign of a problem developing with your thyroid.          You are required to meet with your team monthly in the first 6 months in order to receive prescriptions for weight loss medications.  Medication without sustainable change will not lead to long term weight loss.  We will support a weight loss strategy that has short term gains at the expense of long term harm.     Notes about supplements:  Probiotics: Consume foods with probiotics or take a probiotic daily (like Udo's Adult Probiotic, VSL-3, Florastor, or Culturelle).   Omega-3: Take a good quality omega -3 supplement (ex:[employer] - sold here, [employer], [employer], [employer]) or eat 2 servings of fatty fish weekly.   Vitamin D 3: 2,000 IU daily (or more if deficient) with a meal containing fat for best absorption  Magnesium: 200 mg taken at night can be very useful for constipation.  Use as needed.    Commit to follow up --- loose, gain or stay the same.  Studies show that the most successful patients meet with their providers every 2-4 weeks.  Change will happen if you continue to manage and modify your plan.  We are here to help you do that no matter what happens in the interim. When you are executing your plan well, we will add to your plan.  When you are struggling, that is when you need your appointments the most. Cancelling or rescheduling appointments to give yourself time to make changes is counterproductive.      In Conclusion:     Our plan of action includes frequent visits over the next year to focus on education regarding the appropriate nutrition and physical activity best suited to our patient's individual needs, SMART goal setting and other strategies for lifestyle change, mindfulness, stress reduction,  accountability, and review of any initiated medical interventions.  Visits will space out over time with formation of new life habits and stabilization of the treatment plan. We will enlist the services of the nutritionist, and may in the future consult with exercise physiology or emotional wellness.      Pre-chart/Chart Prep: 5 minutes  Face to Face time: 45 minutes  Chart Completion: 10 minutes    I spent 60 minutes on this date of service performing the following activities: obtaining history, performing examination, entering orders, documenting, preparing for visit, obtaining / reviewing records, and providing counseling and education.

## 2023-09-27 NOTE — ASSESSMENT & PLAN NOTE
Elevated cholesterol levels often come from a blend of genetic and environmental factors.  Maximizing nutrition and physical activity is an important adjunct to medical management to treat elevated cholesterol levels and reduce the risk of arthrosclerotic disease such as heart attacks, strokes, and peripheral arterial disease.

## 2023-09-27 NOTE — ASSESSMENT & PLAN NOTE
High blood pressure can come from various reasons.  Excess weight can worsen and sometimes be the cause of high blood pressure through putting pressure on the kidneys through mechanical forces as well as increasing body inflammation and promoting water retention.

## 2023-10-05 DIAGNOSIS — E66.01 CLASS 2 SEVERE OBESITY WITH SERIOUS COMORBIDITY AND BODY MASS INDEX (BMI) OF 38.0 TO 38.9 IN ADULT, UNSPECIFIED OBESITY TYPE (CMS/HCC): ICD-10-CM

## 2023-10-05 DIAGNOSIS — E66.812 CLASS 2 SEVERE OBESITY WITH SERIOUS COMORBIDITY AND BODY MASS INDEX (BMI) OF 38.0 TO 38.9 IN ADULT, UNSPECIFIED OBESITY TYPE (CMS/HCC): ICD-10-CM

## 2023-10-05 RX ORDER — SEMAGLUTIDE 0.25 MG/.5ML
0.25 INJECTION, SOLUTION SUBCUTANEOUS
Qty: 2 ML | Refills: 0 | Status: SHIPPED | OUTPATIENT
Start: 2023-10-05

## 2023-11-27 ENCOUNTER — TELEPHONE (OUTPATIENT)
Dept: CARDIOLOGY | Facility: CLINIC | Age: 53
End: 2023-11-27
Payer: COMMERCIAL

## 2023-11-27 NOTE — TELEPHONE ENCOUNTER
Please call the patient and let her know that on the last office visit I forgot to reorder the echocardiogram.  I had ordered the echocardiogram a few years ago but she could not get it done due to the COVID-19 pandemic.  She can get it done at her convenience and at the location that is closer to her.  She should get it in the next few months.  She will need preauthorization from her insurance.

## 2023-11-27 NOTE — ASSESSMENT & PLAN NOTE
The patient wants to lose weight and her attempts have not been successful.  I advised her to be enrolled in [facility] weight management clinic.  There is no contraindication for her to take weight loss medications.

## 2023-11-28 LAB
ATRIAL RATE: 77
PR INTERVAL: 152
QRS DURATION: 88
QT INTERVAL: 424
QTC CALCULATION(BAZETT): 479
R AXIS: 184
T WAVE AXIS: 176
VENTRICULAR RATE: 77

## 2023-12-27 NOTE — TELEPHONE ENCOUNTER
NPR for [id number]) per [facility] via [facility] provider portal.Ref #[id number]

## 2024-07-30 ENCOUNTER — TRANSCRIBE ORDERS (OUTPATIENT)
Dept: RADIOLOGY | Age: 54
End: 2024-07-30

## 2024-07-30 DIAGNOSIS — Z12.31 ENCOUNTER FOR SCREENING MAMMOGRAM FOR MALIGNANT NEOPLASM OF BREAST: Primary | ICD-10-CM

## 2024-09-05 ENCOUNTER — TELEPHONE (OUTPATIENT)
Dept: SCHEDULING | Facility: CLINIC | Age: 54
End: 2024-09-05
Payer: COMMERCIAL

## 2024-09-05 NOTE — TELEPHONE ENCOUNTER
Pt needs to r/s 09/11 appt as she started a new job. Pt is seeking an appt 10/03 or 11/04 or 11/05    Pt can be reached at [phone]

## 2024-09-11 ENCOUNTER — OFFICE VISIT (OUTPATIENT)
Dept: CARDIOLOGY | Facility: CLINIC | Age: 54
End: 2024-09-11
Payer: COMMERCIAL

## 2024-09-11 VITALS
WEIGHT: 190 LBS | HEIGHT: 64 IN | BODY MASS INDEX: 32.44 KG/M2 | OXYGEN SATURATION: 97 % | HEART RATE: 71 BPM | DIASTOLIC BLOOD PRESSURE: 88 MMHG | SYSTOLIC BLOOD PRESSURE: 136 MMHG

## 2024-09-11 DIAGNOSIS — I45.6 WOLFF-PARKINSON-WHITE (WPW) SYNDROME: ICD-10-CM

## 2024-09-11 DIAGNOSIS — I05.9 MITRAL VALVE DISORDER: ICD-10-CM

## 2024-09-11 DIAGNOSIS — I47.10 PAROXYSMAL SUPRAVENTRICULAR TACHYCARDIA (CMS/HCC): Primary | ICD-10-CM

## 2024-09-11 DIAGNOSIS — I10 ESSENTIAL HYPERTENSION: ICD-10-CM

## 2024-09-11 PROCEDURE — 99214 OFFICE O/P EST MOD 30 MIN: CPT | Performed by: INTERNAL MEDICINE

## 2024-09-11 PROCEDURE — 3008F BODY MASS INDEX DOCD: CPT | Performed by: INTERNAL MEDICINE

## 2024-09-11 PROCEDURE — 93000 ELECTROCARDIOGRAM COMPLETE: CPT | Performed by: INTERNAL MEDICINE

## 2024-09-11 NOTE — ASSESSMENT & PLAN NOTE
She has not had an echcocardiogram years I previously ordered. She is asymptomatic.  She has a past diagnosis of mitral valve disorder but has not had an echocardiogram in years.   He physical exam is normal without mitral regurgitation.   I reminded her to schedule the echocardiogram.

## 2024-09-11 NOTE — PROGRESS NOTES
Larissa Galdamez MD, [facility]  Cardiac Electrophysiology    [facility]    [facility] [address] [facility]  [address] [place] [address]    TEL  [phone]  [facility]     e ID: 0nterprise ID: [id number]                 Electrophysiology Progress Note                        September 11, 2024  HPI:  Taryn Virgen is a 53 y.o. female who comes to the office today for a follow up for a history of supraventricular tachycardia status post catheter mediated ablation procedure for WPW and for AVNRT and a history of hypertension. She was accompanied by her mother and her young daughter.    At her last telemedicine visit on 09/06/2023, she reported that she has been dealing with significant amount of personal stress as her 24-year old son who was recently diagnosed with multiple sclerosis (MS) and also her mother undergoing for the GI cancer treatment. Patient continues to experience the episodes of heart palpitations mostly triggers by her personal stress. She reported that she requires a new prescriptions for metoprolol tartrate 25-mg P.O PRN. She is concerns about her weight as she is having the problem with weight management. She started the Prozac 20-mg P.O once daily.     Today, patient reports that in the past year she has only felt sporadic brief episodes of heart palpitations and sometimes she also feels early beats. Otherwise, she feels well and stable from a cardiovascular standpoint. 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  Patient is managing her weight with Wegovy and has been losing weight. No dizziness, near syncope or syncope. No chest pain. No dyspnea on exertion, PND, orthopnea or pedal edema.     PROBLEM LIST:  1. Hypertension.  2. Supraventricular tachycardia status post ablation of a left-sided atrioventricular accessory  pathway in 1996.  3. Catheter ablation in 2009 for inducible atrioventricular node reentrant  tachycardia.  4. Obesity.  5. Anxiety and depression secondary to significant stress in her personal life.  6.  Vasovagal symptoms during pregnancy.    Patient Active Problem List   Diagnosis    Mitral valve disorder    Paroxysmal supraventricular tachycardia (CMS/HCC)    Edema    Dizziness    Gloria-Parkinson-White (WPW) syndrome    Essential hypertension    Fatty liver disease, nonalcoholic    Class 2 severe obesity with serious comorbidity and body mass index (BMI) of 38.0 to 38.9 in adult     Mixed hyperlipidemia     Past Medical History:   Diagnosis Date    Anxiety     Arrhythmia     Back pain Yrs ago    Depression     Fatty liver 3 yrs ago    History of snoring Months ago    Joint pain Years ago      Past Surgical History   Procedure Laterality Date    Breast biopsy      Reduction mammaplasty  2007    Vaginal hysterectomy  04/2016      CURRENT MEDICATIONS:  Current Outpatient Medications   Medication Sig Dispense Refill    FLUoxetine (PROzac) 40 mg capsule Take 40 mg by mouth daily.      hydrochlorothiazide (HYDRODIURIL) 25 mg tablet Take 1 tablet by oral route once daily as needed. (Patient taking differently: Take 25 mg by mouth daily. Take 1 tablet by oral route once daily as needed.) 30 tablet 1    metoprolol tartrate (LOPRESSOR) 25 mg tablet Take 1 tablet (25 mg total) by mouth 2 (two) times a day as needed (palpitations or high blood pressue). 90 tablet 3    semaglutide, weight loss, (WEGOVY) 0.25 mg/0.5 mL subcutaneous injection Inject 0.25 mg under the skin every (seven) 7 days. 2 mL 0     No current facility-administered medications for this visit.     ALLERGIES: Sulfa.    SOCIAL HISTORY: She is  and lives with her  and younger daughter. She has stepsons.  No history of cigarette smoking.  She drinks alcohol on social occasions.  Her mother who accompany her in the office today was diagnosed with cancer last this past year but is doing very well in remission.    REVIEW OF SYSTEMS:   As in  "HPI.  All other other systems were reviewed and are negative.    Constitution: Negative for fever and malaise/fatigue.     Vitals:    09/11/24 1059   BP: 136/88   BP Location: Right upper arm   Patient Position: Sitting   Pulse: 71   SpO2: 97%   Weight: 86.2 kg (190 lb)   Height: 1.626 m (5' 4\")      PHYSICAL EXAMINATION: Healthy-appearing young woman in no acute distress.  Neck revealed no jugular venous distention.  No carotid bruits.  Lungs were clear to  auscultation. No rales or wheezes. Cardiovascular exam revealed normal S1, S2, regular rhythm  with no murmurs, gallops or rubs. Abdomen was soft and nontender.  Obese.  No masses. Extremities  revealed no edema. Alert and oriented x 3, no focal deficits to gross neurologic exam.  Skin: No lesions.  Psychiatric normal mood and affect.    Lab Results   Component Value Date    HGB 13.9 09/13/2023     09/13/2023    CHOL 209 (H) 09/13/2023    TRIG 149 09/13/2023    HDL 47 09/13/2023    ALT 28 09/13/2023    AST 21 09/13/2023     (H) 09/13/2023    K 3.7 09/13/2023    CREATININE 0.87 09/13/2023    TSH 1.500 09/13/2023    LDLCALC 135 (H) 09/13/2023     DATA REVIEW:  ECG 09/11/2024: I personally reviewed her 12-lead EKG performed today which reveals Normal Sinus rhythm. Normal EKG.      ECG 09/06/2023: I personally reviewed her 12-lead EKG performed today which reveals Normal sinus rhythm   Right superior axis deviation   Nonspecific T wave abnormality    Laboratory data from June 21, 2017 which included a CBC, comprehensive metabolic panel, magnesium level and TSH level were normal except for very mild elevation of liver function tests.    IMPRESSION/RECOMMENDATIONS:    Assessment/Plan      Gloria-Parkinson-White (WPW) syndrome     No recurrence of tachycardia after undergoing catheter mediated ablation procedure in 1996.    Paroxysmal supraventricular tachycardia (CMS/HCC)  She feels occasional brief palpitations.  No recurrence of supraventricular " tachycardia after undergoing catheter mediated ablation for atrioventricular nannette reentry tachycardia and also for AV accessory pathway mediated tachycardia (WPW).  She has a prescription for metoprolol to tartrate to be taken only as needed if she has sustained palpitations.      Mitral valve disorder  She has not had an echcocardiogram years I previously ordered. She is asymptomatic.  She has a past diagnosis of mitral valve disorder but has not had an echocardiogram in years.   He physical exam is normal without mitral regurgitation.   I reminded her to schedule the echocardiogram.    Essential hypertension  Her blood pressure is controlled in the office today. I  f she continues to lose weight; she may not need antihypertensive medications.       Return in about 6 months (around 3/11/2025).    Thank you for allowing me to participate in the care of your patient.  Please do not hesitate to contact me if you have any questions regarding my recommendations and management.    Sincerely;    Larissa Elias M.D., FACC , FACP    This document was generated utilizing voice recognition technology. A reasonable attempt at proofreading has been made to minimize errors but please excuse any typographical errors which may be present. Please call with any questions.    By signing my name below, I, Jenifer Arriaga, attest that this documentation has been prepared under the direction and in the presence of Larissa Galdamez MD Electronically signed: Komal Patel. 9/11/2024 11:25 AM     ILarissa MD, personally performed the services described in this documentation. All medical record entries made by the scribe were at my direction and in my presence. I have reviewed the chart and agree that the record reflects my personal performance and is accurate and complete. Larissa Galdamez MD. 9/11/2024. 11:25 AM.     no cyanosis/no clubbing/no pedal edema

## 2024-09-11 NOTE — LETTER
September 27, 2024     William Lozoya MD  [address] [facility] [address]    Patient: Taryn Virgen  YOB: 1970  Date of Visit: 9/11/2024      Dear Dr. Lozoya:    Thank you for referring Taryn Virgen to me for evaluation. Below are my notes for this consultation.    If you have questions, please do not hesitate to call me. I look forward to following your patient along with you.         Sincerely,        Larissa Galdamez MD        CC: No Larissa Ren MD  9/29/2024  5:39 PM  Sign when Signing Visit   Larissa Galdamez MD, [facility]  Cardiac Electrophysiology    [facility]  [facility]  [address] [place] [address]    TEL  [phone]  [facility]     e ID: 0nterprise ID: [id number]                 Electrophysiology Progress Note                        September 11, 2024  HPI:  Taryn Virgen is a 53 y.o. female who comes to the office today for a follow up for a history of supraventricular tachycardia status post catheter mediated ablation procedure for WPW and for AVNRT and a history of hypertension. She was accompanied by her mother and her young daughter.    At her last telemedicine visit on 09/06/2023, she reported that she has been dealing with significant amount of personal stress as her 24-year old son who was recently diagnosed with multiple sclerosis (MS) and also her mother undergoing for the GI cancer treatment. Patient continues to experience the episodes of heart palpitations mostly triggers by her personal stress. She reported that she requires a new prescriptions for metoprolol tartrate 25-mg P.O PRN. She is concerns about her weight as she is having the problem with weight management. She started the Prozac 20-mg P.O once daily.     Today, patient reports that in the past year she has only felt sporadic brief episodes of heart palpitations and sometimes she also feels  early beats. Otherwise, she feels well and stable from a cardiovascular standpoint. 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  Patient is managing her weight with Wegovy and has been losing weight. No dizziness, near syncope or syncope. No chest pain. No dyspnea on exertion, PND, orthopnea or pedal edema.     PROBLEM LIST:  1. Hypertension.  2. Supraventricular tachycardia status post ablation of a left-sided atrioventricular accessory  pathway in 1996.  3. Catheter ablation in 2009 for inducible atrioventricular node reentrant tachycardia.  4. Obesity.  5. Anxiety and depression secondary to significant stress in her personal life.  6.  Vasovagal symptoms during pregnancy.    Patient Active Problem List   Diagnosis   • Mitral valve disorder   • Paroxysmal supraventricular tachycardia (CMS/HCC)   • Edema   • Dizziness   • Gloria-Parkinson-White (WPW) syndrome   • Essential hypertension   • Fatty liver disease, nonalcoholic   • Class 2 severe obesity with serious comorbidity and body mass index (BMI) of 38.0 to 38.9 in adult    • Mixed hyperlipidemia     Past Medical History:   Diagnosis Date   • Anxiety    • Arrhythmia    • Back pain Yrs ago   • Depression    • Fatty liver 3 yrs ago   • History of snoring Months ago   • Joint pain Years ago      Past Surgical History   Procedure Laterality Date   • Breast biopsy     • Reduction mammaplasty  2007   • Vaginal hysterectomy  04/2016      CURRENT MEDICATIONS:  Current Outpatient Medications   Medication Sig Dispense Refill   • FLUoxetine (PROzac) 40 mg capsule Take 40 mg by mouth daily.     • hydrochlorothiazide (HYDRODIURIL) 25 mg tablet Take 1 tablet by oral route once daily as needed. (Patient taking differently: Take 25 mg by mouth daily. Take 1 tablet by oral route once daily as needed.) 30 tablet 1   • metoprolol tartrate (LOPRESSOR) 25 mg tablet Take 1 tablet (25 mg total) by mouth 2 (two) times a day as needed (palpitations or high blood pressue). 90  "tablet 3   • semaglutide, weight loss, (WEGOVY) 0.25 mg/0.5 mL subcutaneous injection Inject 0.25 mg under the skin every (seven) 7 days. 2 mL 0     No current facility-administered medications for this visit.     ALLERGIES: Sulfa.    SOCIAL HISTORY: She is  and lives with her  and younger daughter. She has stepsons.  No history of cigarette smoking.  She drinks alcohol on social occasions.  Her mother who accompany her in the office today was diagnosed with cancer last this past year but is doing very well in remission.    REVIEW OF SYSTEMS:   As in HPI.  All other other systems were reviewed and are negative.    Constitution: Negative for fever and malaise/fatigue.     Vitals:    09/11/24 1059   BP: 136/88   BP Location: Right upper arm   Patient Position: Sitting   Pulse: 71   SpO2: 97%   Weight: 86.2 kg (190 lb)   Height: 1.626 m (5' 4\")      PHYSICAL EXAMINATION: Healthy-appearing young woman in no acute distress.  Neck revealed no jugular venous distention.  No carotid bruits.  Lungs were clear to  auscultation. No rales or wheezes. Cardiovascular exam revealed normal S1, S2, regular rhythm  with no murmurs, gallops or rubs. Abdomen was soft and nontender.  Obese.  No masses. Extremities  revealed no edema. Alert and oriented x 3, no focal deficits to gross neurologic exam.  Skin: No lesions.  Psychiatric normal mood and affect.    Lab Results   Component Value Date    HGB 13.9 09/13/2023     09/13/2023    CHOL 209 (H) 09/13/2023    TRIG 149 09/13/2023    HDL 47 09/13/2023    ALT 28 09/13/2023    AST 21 09/13/2023     (H) 09/13/2023    K 3.7 09/13/2023    CREATININE 0.87 09/13/2023    TSH 1.500 09/13/2023    LDLCALC 135 (H) 09/13/2023     DATA REVIEW:  ECG 09/11/2024: I personally reviewed her 12-lead EKG performed today which reveals Normal Sinus rhythm. Normal EKG.      ECG 09/06/2023: I personally reviewed her 12-lead EKG performed today which reveals Normal sinus rhythm   Right " superior axis deviation   Nonspecific T wave abnormality    Laboratory data from June 21, 2017 which included a CBC, comprehensive metabolic panel, magnesium level and TSH level were normal except for very mild elevation of liver function tests.    IMPRESSION/RECOMMENDATIONS:    Assessment/Plan     Gloria-Parkinson-White (WPW) syndrome     No recurrence of tachycardia after undergoing catheter mediated ablation procedure in 1996.    Paroxysmal supraventricular tachycardia (CMS/HCC)  She feels occasional brief palpitations.  No recurrence of supraventricular tachycardia after undergoing catheter mediated ablation for atrioventricular nannette reentry tachycardia and also for AV accessory pathway mediated tachycardia (WPW).  She has a prescription for metoprolol to tartrate to be taken only as needed if she has sustained palpitations.      Mitral valve disorder  She has not had an echcocardiogram years I previously ordered. She is asymptomatic.  She has a past diagnosis of mitral valve disorder but has not had an echocardiogram in years.   He physical exam is normal without mitral regurgitation.   I reminded her to schedule the echocardiogram.    Essential hypertension  Her blood pressure is controlled in the office today. I  f she continues to lose weight; she may not need antihypertensive medications.       Return in about 6 months (around 3/11/2025).    Thank you for allowing me to participate in the care of your patient.  Please do not hesitate to contact me if you have any questions regarding my recommendations and management.    Sincerely;    Larissa Elias M.D., FACC , FACP    This document was generated utilizing voice recognition technology. A reasonable attempt at proofreading has been made to minimize errors but please excuse any typographical errors which may be present. Please call with any questions.    By signing my name below, I, Jenifer Arriaga, attest that this documentation has been prepared under  the direction and in the presence of Larissa Galdamez MD Electronically signed: Komal Patel. 9/11/2024 11:25 AM     I, Larissa Galdamez MD, personally performed the services described in this documentation. All medical record entries made by the scribe were at my direction and in my presence. I have reviewed the chart and agree that the record reflects my personal performance and is accurate and complete. Larissa Galdamez MD. 9/11/2024. 11:25 AM.

## 2024-09-11 NOTE — ASSESSMENT & PLAN NOTE
Her blood pressure is controlled in the office today. I  f she continues to lose weight; she may not need antihypertensive medications.

## 2024-09-11 NOTE — ASSESSMENT & PLAN NOTE
She feels occasional brief palpitations.  No recurrence of supraventricular tachycardia after undergoing catheter mediated ablation for atrioventricular nannette reentry tachycardia and also for AV accessory pathway mediated tachycardia (WPW).  She has a prescription for metoprolol to tartrate to be taken only as needed if she has sustained palpitations.

## 2024-09-29 LAB
ATRIAL RATE: 71
P AXIS: 30
PR INTERVAL: 152
QRS DURATION: 88
QT INTERVAL: 400
QTC CALCULATION(BAZETT): 435
R AXIS: 0
T WAVE AXIS: 21
VENTRICULAR RATE: 71

## 2024-10-03 ENCOUNTER — TRANSCRIBE ORDERS (OUTPATIENT)
Dept: RADIOLOGY | Age: 54
End: 2024-10-03

## 2024-10-03 ENCOUNTER — HOSPITAL ENCOUNTER (OUTPATIENT)
Dept: RADIOLOGY | Age: 54
Discharge: HOME | End: 2024-10-03
Attending: OBSTETRICS & GYNECOLOGY
Payer: COMMERCIAL

## 2024-10-03 DIAGNOSIS — Z12.31 ENCOUNTER FOR SCREENING MAMMOGRAM FOR MALIGNANT NEOPLASM OF BREAST: ICD-10-CM

## 2024-10-03 DIAGNOSIS — Z12.31 ENCOUNTER FOR SCREENING MAMMOGRAM FOR MALIGNANT NEOPLASM OF BREAST: Primary | ICD-10-CM

## 2024-10-03 PROCEDURE — 77067 SCR MAMMO BI INCL CAD: CPT
